# Patient Record
Sex: FEMALE | Race: ASIAN | NOT HISPANIC OR LATINO
[De-identification: names, ages, dates, MRNs, and addresses within clinical notes are randomized per-mention and may not be internally consistent; named-entity substitution may affect disease eponyms.]

---

## 2018-04-18 PROBLEM — Z00.00 ENCOUNTER FOR PREVENTIVE HEALTH EXAMINATION: Status: ACTIVE | Noted: 2018-04-18

## 2018-05-17 ENCOUNTER — LABORATORY RESULT (OUTPATIENT)
Age: 23
End: 2018-05-17

## 2018-05-17 ENCOUNTER — APPOINTMENT (OUTPATIENT)
Dept: OBGYN | Facility: CLINIC | Age: 23
End: 2018-05-17
Payer: COMMERCIAL

## 2018-05-17 VITALS
BODY MASS INDEX: 28.68 KG/M2 | DIASTOLIC BLOOD PRESSURE: 60 MMHG | SYSTOLIC BLOOD PRESSURE: 91 MMHG | WEIGHT: 142.25 LBS | HEIGHT: 59 IN

## 2018-05-17 DIAGNOSIS — Z78.9 OTHER SPECIFIED HEALTH STATUS: ICD-10-CM

## 2018-05-17 DIAGNOSIS — Z83.3 FAMILY HISTORY OF DIABETES MELLITUS: ICD-10-CM

## 2018-05-17 DIAGNOSIS — Z82.49 FAMILY HISTORY OF ISCHEMIC HEART DISEASE AND OTHER DISEASES OF THE CIRCULATORY SYSTEM: ICD-10-CM

## 2018-05-17 PROCEDURE — 0501F PRENATAL FLOW SHEET: CPT

## 2018-05-21 ENCOUNTER — NON-APPOINTMENT (OUTPATIENT)
Age: 23
End: 2018-05-21

## 2018-05-21 PROBLEM — Z83.3 FAMILY HISTORY OF DIABETES MELLITUS: Status: ACTIVE | Noted: 2018-05-21

## 2018-05-21 PROBLEM — Z78.9 DOES NOT USE ILLICIT DRUGS: Status: ACTIVE | Noted: 2018-05-21

## 2018-05-21 PROBLEM — Z82.49 FAMILY HISTORY OF ESSENTIAL HYPERTENSION: Status: ACTIVE | Noted: 2018-05-21

## 2018-06-05 ENCOUNTER — OTHER (OUTPATIENT)
Age: 23
End: 2018-06-05

## 2018-06-18 ENCOUNTER — ASOB RESULT (OUTPATIENT)
Age: 23
End: 2018-06-18

## 2018-06-18 ENCOUNTER — APPOINTMENT (OUTPATIENT)
Dept: ANTEPARTUM | Facility: CLINIC | Age: 23
End: 2018-06-18
Payer: COMMERCIAL

## 2018-06-18 PROCEDURE — 76811 OB US DETAILED SNGL FETUS: CPT

## 2018-06-19 ENCOUNTER — APPOINTMENT (OUTPATIENT)
Dept: OBGYN | Facility: CLINIC | Age: 23
End: 2018-06-19

## 2018-06-28 ENCOUNTER — NON-APPOINTMENT (OUTPATIENT)
Age: 23
End: 2018-06-28

## 2018-06-28 ENCOUNTER — APPOINTMENT (OUTPATIENT)
Dept: OBGYN | Facility: CLINIC | Age: 23
End: 2018-06-28
Payer: COMMERCIAL

## 2018-06-28 VITALS
DIASTOLIC BLOOD PRESSURE: 76 MMHG | SYSTOLIC BLOOD PRESSURE: 112 MMHG | WEIGHT: 145.19 LBS | BODY MASS INDEX: 29.27 KG/M2 | HEIGHT: 59 IN

## 2018-06-28 PROCEDURE — 0502F SUBSEQUENT PRENATAL CARE: CPT

## 2018-07-01 ENCOUNTER — TRANSCRIPTION ENCOUNTER (OUTPATIENT)
Age: 23
End: 2018-07-01

## 2018-07-30 ENCOUNTER — NON-APPOINTMENT (OUTPATIENT)
Age: 23
End: 2018-07-30

## 2018-07-30 ENCOUNTER — APPOINTMENT (OUTPATIENT)
Dept: OBGYN | Facility: CLINIC | Age: 23
End: 2018-07-30
Payer: COMMERCIAL

## 2018-07-30 VITALS
DIASTOLIC BLOOD PRESSURE: 79 MMHG | HEIGHT: 59 IN | BODY MASS INDEX: 30.04 KG/M2 | WEIGHT: 149 LBS | SYSTOLIC BLOOD PRESSURE: 114 MMHG

## 2018-07-30 PROCEDURE — 0502F SUBSEQUENT PRENATAL CARE: CPT

## 2018-07-31 ENCOUNTER — OTHER (OUTPATIENT)
Age: 23
End: 2018-07-31

## 2018-07-31 LAB
BASOPHILS # BLD AUTO: 0.02 K/UL
BASOPHILS NFR BLD AUTO: 0.1 %
EOSINOPHIL # BLD AUTO: 0.24 K/UL
EOSINOPHIL NFR BLD AUTO: 1.8 %
GLUCOSE 1H P 50 G GLC PO SERPL-MCNC: 129 MG/DL
HCT VFR BLD CALC: 35 %
HGB BLD-MCNC: 11.2 G/DL
IMM GRANULOCYTES NFR BLD AUTO: 0.3 %
LYMPHOCYTES # BLD AUTO: 3.04 K/UL
LYMPHOCYTES NFR BLD AUTO: 22.7 %
MAN DIFF?: NORMAL
MCHC RBC-ENTMCNC: 28.9 PG
MCHC RBC-ENTMCNC: 32 GM/DL
MCV RBC AUTO: 90.2 FL
MONOCYTES # BLD AUTO: 0.81 K/UL
MONOCYTES NFR BLD AUTO: 6.1 %
NEUTROPHILS # BLD AUTO: 9.23 K/UL
NEUTROPHILS NFR BLD AUTO: 69 %
PLATELET # BLD AUTO: 283 K/UL
RBC # BLD: 3.88 M/UL
RBC # FLD: 15 %
WBC # FLD AUTO: 13.38 K/UL

## 2018-08-15 ENCOUNTER — APPOINTMENT (OUTPATIENT)
Dept: OBGYN | Facility: CLINIC | Age: 23
End: 2018-08-15
Payer: COMMERCIAL

## 2018-08-15 ENCOUNTER — NON-APPOINTMENT (OUTPATIENT)
Age: 23
End: 2018-08-15

## 2018-08-15 VITALS
DIASTOLIC BLOOD PRESSURE: 73 MMHG | BODY MASS INDEX: 30.35 KG/M2 | WEIGHT: 150.56 LBS | SYSTOLIC BLOOD PRESSURE: 114 MMHG | HEIGHT: 59 IN

## 2018-08-15 PROCEDURE — 0502F SUBSEQUENT PRENATAL CARE: CPT

## 2018-09-06 ENCOUNTER — NON-APPOINTMENT (OUTPATIENT)
Age: 23
End: 2018-09-06

## 2018-09-06 ENCOUNTER — APPOINTMENT (OUTPATIENT)
Dept: OBGYN | Facility: CLINIC | Age: 23
End: 2018-09-06
Payer: COMMERCIAL

## 2018-09-06 VITALS
SYSTOLIC BLOOD PRESSURE: 124 MMHG | BODY MASS INDEX: 31.6 KG/M2 | HEIGHT: 59 IN | DIASTOLIC BLOOD PRESSURE: 82 MMHG | WEIGHT: 156.75 LBS

## 2018-09-06 PROCEDURE — 0502F SUBSEQUENT PRENATAL CARE: CPT

## 2018-09-20 ENCOUNTER — APPOINTMENT (OUTPATIENT)
Dept: OBGYN | Facility: CLINIC | Age: 23
End: 2018-09-20
Payer: COMMERCIAL

## 2018-09-20 ENCOUNTER — ASOB RESULT (OUTPATIENT)
Age: 23
End: 2018-09-20

## 2018-09-20 ENCOUNTER — NON-APPOINTMENT (OUTPATIENT)
Age: 23
End: 2018-09-20

## 2018-09-20 ENCOUNTER — APPOINTMENT (OUTPATIENT)
Dept: ANTEPARTUM | Facility: CLINIC | Age: 23
End: 2018-09-20
Payer: COMMERCIAL

## 2018-09-20 VITALS
HEIGHT: 59 IN | WEIGHT: 153.9 LBS | SYSTOLIC BLOOD PRESSURE: 116 MMHG | BODY MASS INDEX: 31.03 KG/M2 | DIASTOLIC BLOOD PRESSURE: 78 MMHG

## 2018-09-20 PROCEDURE — 76816 OB US FOLLOW-UP PER FETUS: CPT

## 2018-09-20 PROCEDURE — 90715 TDAP VACCINE 7 YRS/> IM: CPT

## 2018-09-20 PROCEDURE — 90471 IMMUNIZATION ADMIN: CPT

## 2018-09-20 PROCEDURE — 76819 FETAL BIOPHYS PROFIL W/O NST: CPT

## 2018-09-20 PROCEDURE — 0502F SUBSEQUENT PRENATAL CARE: CPT

## 2018-10-04 ENCOUNTER — NON-APPOINTMENT (OUTPATIENT)
Age: 23
End: 2018-10-04

## 2018-10-04 ENCOUNTER — APPOINTMENT (OUTPATIENT)
Dept: OBGYN | Facility: CLINIC | Age: 23
End: 2018-10-04
Payer: COMMERCIAL

## 2018-10-04 VITALS
WEIGHT: 206 LBS | BODY MASS INDEX: 41.53 KG/M2 | HEIGHT: 59 IN | SYSTOLIC BLOOD PRESSURE: 128 MMHG | HEART RATE: 102 BPM | DIASTOLIC BLOOD PRESSURE: 83 MMHG

## 2018-10-04 VITALS
DIASTOLIC BLOOD PRESSURE: 84 MMHG | SYSTOLIC BLOOD PRESSURE: 116 MMHG | WEIGHT: 157 LBS | HEIGHT: 59 IN | BODY MASS INDEX: 31.65 KG/M2

## 2018-10-04 PROCEDURE — 90688 IIV4 VACCINE SPLT 0.5 ML IM: CPT

## 2018-10-04 PROCEDURE — 0502F SUBSEQUENT PRENATAL CARE: CPT

## 2018-10-04 PROCEDURE — 90471 IMMUNIZATION ADMIN: CPT

## 2018-10-11 ENCOUNTER — APPOINTMENT (OUTPATIENT)
Dept: OBGYN | Facility: CLINIC | Age: 23
End: 2018-10-11
Payer: COMMERCIAL

## 2018-10-11 VITALS
HEIGHT: 59 IN | WEIGHT: 158.5 LBS | BODY MASS INDEX: 31.95 KG/M2 | SYSTOLIC BLOOD PRESSURE: 124 MMHG | DIASTOLIC BLOOD PRESSURE: 83 MMHG

## 2018-10-11 PROCEDURE — 0502F SUBSEQUENT PRENATAL CARE: CPT

## 2018-10-16 ENCOUNTER — OTHER (OUTPATIENT)
Age: 23
End: 2018-10-16

## 2018-10-16 ENCOUNTER — CLINICAL ADVICE (OUTPATIENT)
Age: 23
End: 2018-10-16

## 2018-10-18 ENCOUNTER — APPOINTMENT (OUTPATIENT)
Dept: OBGYN | Facility: CLINIC | Age: 23
End: 2018-10-18
Payer: COMMERCIAL

## 2018-10-18 VITALS
HEIGHT: 59 IN | BODY MASS INDEX: 32.25 KG/M2 | WEIGHT: 160 LBS | DIASTOLIC BLOOD PRESSURE: 81 MMHG | SYSTOLIC BLOOD PRESSURE: 118 MMHG

## 2018-10-18 DIAGNOSIS — Z34.03 ENCOUNTER FOR SUPERVISION OF NORMAL FIRST PREGNANCY, THIRD TRIMESTER: ICD-10-CM

## 2018-10-18 PROCEDURE — 0502F SUBSEQUENT PRENATAL CARE: CPT

## 2018-10-24 ENCOUNTER — INPATIENT (INPATIENT)
Facility: HOSPITAL | Age: 23
LOS: 3 days | Discharge: HOME | End: 2018-10-28
Attending: OBSTETRICS & GYNECOLOGY | Admitting: OBSTETRICS & GYNECOLOGY
Payer: COMMERCIAL

## 2018-10-24 ENCOUNTER — EMERGENCY (EMERGENCY)
Facility: HOSPITAL | Age: 23
LOS: 1 days | Discharge: STILL ADMITTED OR ADMINISTRATIVE | End: 2018-10-24
Admitting: EMERGENCY MEDICINE

## 2018-10-24 ENCOUNTER — TRANSCRIPTION ENCOUNTER (OUTPATIENT)
Age: 23
End: 2018-10-24

## 2018-10-24 ENCOUNTER — OTHER (OUTPATIENT)
Age: 23
End: 2018-10-24

## 2018-10-24 VITALS
HEART RATE: 116 BPM | RESPIRATION RATE: 20 BRPM | SYSTOLIC BLOOD PRESSURE: 129 MMHG | DIASTOLIC BLOOD PRESSURE: 90 MMHG | OXYGEN SATURATION: 100 % | TEMPERATURE: 98 F

## 2018-10-24 VITALS — HEIGHT: 59 IN | WEIGHT: 158.73 LBS

## 2018-10-24 DIAGNOSIS — O26.899 OTHER SPECIFIED PREGNANCY RELATED CONDITIONS, UNSPECIFIED TRIMESTER: ICD-10-CM

## 2018-10-24 DIAGNOSIS — Z3A.00 WEEKS OF GESTATION OF PREGNANCY NOT SPECIFIED: ICD-10-CM

## 2018-10-24 DIAGNOSIS — O42.10 PREMATURE RUPTURE OF MEMBRANES, ONSET OF LABOR MORE THAN 24 HOURS FOLLOWING RUPTURE, UNSPECIFIED WEEKS OF GESTATION: ICD-10-CM

## 2018-10-24 LAB
BASOPHILS # BLD AUTO: 0.02 K/UL — SIGNIFICANT CHANGE UP (ref 0–0.2)
BASOPHILS NFR BLD AUTO: 0.2 % — SIGNIFICANT CHANGE UP (ref 0–2)
BLD GP AB SCN SERPL QL: NEGATIVE — SIGNIFICANT CHANGE UP
EOSINOPHIL # BLD AUTO: 0.05 K/UL — SIGNIFICANT CHANGE UP (ref 0–0.5)
EOSINOPHIL NFR BLD AUTO: 0.5 % — SIGNIFICANT CHANGE UP (ref 0–6)
HBV SURFACE AG SER-ACNC: NEGATIVE — SIGNIFICANT CHANGE UP
HCT VFR BLD CALC: 38.9 % — SIGNIFICANT CHANGE UP (ref 34.5–45)
HGB BLD-MCNC: 12.6 G/DL — SIGNIFICANT CHANGE UP (ref 11.5–15.5)
IMM GRANULOCYTES # BLD AUTO: 0.06 # — SIGNIFICANT CHANGE UP
IMM GRANULOCYTES NFR BLD AUTO: 0.6 % — SIGNIFICANT CHANGE UP (ref 0–1.5)
LYMPHOCYTES # BLD AUTO: 1.73 K/UL — SIGNIFICANT CHANGE UP (ref 1–3.3)
LYMPHOCYTES # BLD AUTO: 17.9 % — SIGNIFICANT CHANGE UP (ref 13–44)
MCHC RBC-ENTMCNC: 28.7 PG — SIGNIFICANT CHANGE UP (ref 27–34)
MCHC RBC-ENTMCNC: 32.4 % — SIGNIFICANT CHANGE UP (ref 32–36)
MCV RBC AUTO: 88.6 FL — SIGNIFICANT CHANGE UP (ref 80–100)
MONOCYTES # BLD AUTO: 0.54 K/UL — SIGNIFICANT CHANGE UP (ref 0–0.9)
MONOCYTES NFR BLD AUTO: 5.6 % — SIGNIFICANT CHANGE UP (ref 2–14)
NEUTROPHILS # BLD AUTO: 7.27 K/UL — SIGNIFICANT CHANGE UP (ref 1.8–7.4)
NEUTROPHILS NFR BLD AUTO: 75.2 % — SIGNIFICANT CHANGE UP (ref 43–77)
NRBC # FLD: 0 — SIGNIFICANT CHANGE UP
PLATELET # BLD AUTO: 227 K/UL — SIGNIFICANT CHANGE UP (ref 150–400)
PMV BLD: 10.4 FL — SIGNIFICANT CHANGE UP (ref 7–13)
RBC # BLD: 4.39 M/UL — SIGNIFICANT CHANGE UP (ref 3.8–5.2)
RBC # FLD: 14.8 % — HIGH (ref 10.3–14.5)
RH IG SCN BLD-IMP: POSITIVE — SIGNIFICANT CHANGE UP
RH IG SCN BLD-IMP: POSITIVE — SIGNIFICANT CHANGE UP
RUBV IGG SER-ACNC: 1.2 INDEX — SIGNIFICANT CHANGE UP
RUBV IGG SER-IMP: POSITIVE — SIGNIFICANT CHANGE UP
T PALLIDUM AB TITR SER: NEGATIVE — SIGNIFICANT CHANGE UP
WBC # BLD: 9.67 K/UL — SIGNIFICANT CHANGE UP (ref 3.8–10.5)
WBC # FLD AUTO: 9.67 K/UL — SIGNIFICANT CHANGE UP (ref 3.8–10.5)

## 2018-10-24 RX ORDER — BUTORPHANOL TARTRATE 2 MG/ML
2 INJECTION, SOLUTION INTRAMUSCULAR; INTRAVENOUS ONCE
Qty: 0 | Refills: 0 | Status: DISCONTINUED | OUTPATIENT
Start: 2018-10-24 | End: 2018-10-25

## 2018-10-24 RX ORDER — OXYTOCIN 10 UNIT/ML
333.33 VIAL (ML) INJECTION
Qty: 20 | Refills: 0 | Status: DISCONTINUED | OUTPATIENT
Start: 2018-10-24 | End: 2018-10-25

## 2018-10-24 RX ORDER — SODIUM CHLORIDE 9 MG/ML
1000 INJECTION, SOLUTION INTRAVENOUS
Qty: 0 | Refills: 0 | Status: DISCONTINUED | OUTPATIENT
Start: 2018-10-24 | End: 2018-10-25

## 2018-10-24 RX ORDER — SODIUM CHLORIDE 9 MG/ML
1000 INJECTION, SOLUTION INTRAVENOUS ONCE
Qty: 0 | Refills: 0 | Status: DISCONTINUED | OUTPATIENT
Start: 2018-10-24 | End: 2018-10-25

## 2018-10-24 RX ADMIN — SODIUM CHLORIDE 250 MILLILITER(S): 9 INJECTION, SOLUTION INTRAVENOUS at 14:03

## 2018-10-24 RX ADMIN — SODIUM CHLORIDE 250 MILLILITER(S): 9 INJECTION, SOLUTION INTRAVENOUS at 18:04

## 2018-10-25 ENCOUNTER — TRANSCRIPTION ENCOUNTER (OUTPATIENT)
Age: 23
End: 2018-10-25

## 2018-10-25 ENCOUNTER — APPOINTMENT (OUTPATIENT)
Dept: OBGYN | Facility: CLINIC | Age: 23
End: 2018-10-25

## 2018-10-25 PROCEDURE — 59510 CESAREAN DELIVERY: CPT | Mod: U9,UB,GC

## 2018-10-25 RX ORDER — HYDROMORPHONE HYDROCHLORIDE 2 MG/ML
1 INJECTION INTRAMUSCULAR; INTRAVENOUS; SUBCUTANEOUS
Qty: 0 | Refills: 0 | Status: DISCONTINUED | OUTPATIENT
Start: 2018-10-26 | End: 2018-10-26

## 2018-10-25 RX ORDER — ONDANSETRON 8 MG/1
4 TABLET, FILM COATED ORAL EVERY 6 HOURS
Qty: 0 | Refills: 0 | Status: DISCONTINUED | OUTPATIENT
Start: 2018-10-26 | End: 2018-10-27

## 2018-10-25 RX ORDER — AMPICILLIN TRIHYDRATE 250 MG
2 CAPSULE ORAL EVERY 6 HOURS
Qty: 0 | Refills: 0 | Status: DISCONTINUED | OUTPATIENT
Start: 2018-10-25 | End: 2018-10-25

## 2018-10-25 RX ORDER — CITRIC ACID/SODIUM CITRATE 300-500 MG
30 SOLUTION, ORAL ORAL ONCE
Qty: 0 | Refills: 0 | Status: COMPLETED | OUTPATIENT
Start: 2018-10-25 | End: 2018-10-25

## 2018-10-25 RX ORDER — BUTORPHANOL TARTRATE 2 MG/ML
0.12 INJECTION, SOLUTION INTRAMUSCULAR; INTRAVENOUS EVERY 6 HOURS
Qty: 0 | Refills: 0 | Status: DISCONTINUED | OUTPATIENT
Start: 2018-10-26 | End: 2018-10-27

## 2018-10-25 RX ORDER — OXYCODONE HYDROCHLORIDE 5 MG/1
5 TABLET ORAL
Qty: 0 | Refills: 0 | Status: DISCONTINUED | OUTPATIENT
Start: 2018-10-26 | End: 2018-10-26

## 2018-10-25 RX ORDER — NALOXONE HYDROCHLORIDE 4 MG/.1ML
0.1 SPRAY NASAL
Qty: 0 | Refills: 0 | Status: DISCONTINUED | OUTPATIENT
Start: 2018-10-26 | End: 2018-10-27

## 2018-10-25 RX ORDER — GENTAMICIN SULFATE 40 MG/ML
360 VIAL (ML) INJECTION EVERY 24 HOURS
Qty: 0 | Refills: 0 | Status: DISCONTINUED | OUTPATIENT
Start: 2018-10-25 | End: 2018-10-25

## 2018-10-25 RX ORDER — IBUPROFEN 200 MG
600 TABLET ORAL EVERY 6 HOURS
Qty: 0 | Refills: 0 | Status: COMPLETED | OUTPATIENT
Start: 2018-10-26 | End: 2019-09-24

## 2018-10-25 RX ORDER — SIMETHICONE 80 MG/1
80 TABLET, CHEWABLE ORAL EVERY 4 HOURS
Qty: 0 | Refills: 0 | Status: DISCONTINUED | OUTPATIENT
Start: 2018-10-26 | End: 2018-10-28

## 2018-10-25 RX ORDER — SODIUM CHLORIDE 9 MG/ML
1000 INJECTION, SOLUTION INTRAVENOUS
Qty: 0 | Refills: 0 | Status: DISCONTINUED | OUTPATIENT
Start: 2018-10-26 | End: 2018-10-26

## 2018-10-25 RX ORDER — SODIUM CHLORIDE 9 MG/ML
1000 INJECTION, SOLUTION INTRAVENOUS ONCE
Qty: 0 | Refills: 0 | Status: COMPLETED | OUTPATIENT
Start: 2018-10-25 | End: 2018-10-25

## 2018-10-25 RX ORDER — OXYTOCIN 10 UNIT/ML
1 VIAL (ML) INJECTION
Qty: 30 | Refills: 0 | Status: DISCONTINUED | OUTPATIENT
Start: 2018-10-25 | End: 2018-10-25

## 2018-10-25 RX ORDER — METOCLOPRAMIDE HCL 10 MG
10 TABLET ORAL ONCE
Qty: 0 | Refills: 0 | Status: COMPLETED | OUTPATIENT
Start: 2018-10-25 | End: 2018-10-25

## 2018-10-25 RX ORDER — GLYCERIN ADULT
1 SUPPOSITORY, RECTAL RECTAL AT BEDTIME
Qty: 0 | Refills: 0 | Status: DISCONTINUED | OUTPATIENT
Start: 2018-10-26 | End: 2018-10-28

## 2018-10-25 RX ORDER — OXYTOCIN 10 UNIT/ML
41.67 VIAL (ML) INJECTION
Qty: 20 | Refills: 0 | Status: DISCONTINUED | OUTPATIENT
Start: 2018-10-25 | End: 2018-10-26

## 2018-10-25 RX ORDER — ACETAMINOPHEN 500 MG
975 TABLET ORAL EVERY 6 HOURS
Qty: 0 | Refills: 0 | Status: DISCONTINUED | OUTPATIENT
Start: 2018-10-26 | End: 2018-10-28

## 2018-10-25 RX ORDER — FERROUS SULFATE 325(65) MG
325 TABLET ORAL DAILY
Qty: 0 | Refills: 0 | Status: DISCONTINUED | OUTPATIENT
Start: 2018-10-26 | End: 2018-10-28

## 2018-10-25 RX ORDER — OXYCODONE HYDROCHLORIDE 5 MG/1
5 TABLET ORAL
Qty: 0 | Refills: 0 | Status: DISCONTINUED | OUTPATIENT
Start: 2018-10-26 | End: 2018-10-28

## 2018-10-25 RX ORDER — TETANUS TOXOID, REDUCED DIPHTHERIA TOXOID AND ACELLULAR PERTUSSIS VACCINE, ADSORBED 5; 2.5; 8; 8; 2.5 [IU]/.5ML; [IU]/.5ML; UG/.5ML; UG/.5ML; UG/.5ML
0.5 SUSPENSION INTRAMUSCULAR ONCE
Qty: 0 | Refills: 0 | Status: DISCONTINUED | OUTPATIENT
Start: 2018-10-26 | End: 2018-10-28

## 2018-10-25 RX ORDER — FAMOTIDINE 10 MG/ML
20 INJECTION INTRAVENOUS ONCE
Qty: 0 | Refills: 0 | Status: DISCONTINUED | OUTPATIENT
Start: 2018-10-25 | End: 2018-10-25

## 2018-10-25 RX ORDER — LANOLIN
1 OINTMENT (GRAM) TOPICAL
Qty: 0 | Refills: 0 | Status: DISCONTINUED | OUTPATIENT
Start: 2018-10-26 | End: 2018-10-28

## 2018-10-25 RX ORDER — DOCUSATE SODIUM 100 MG
100 CAPSULE ORAL
Qty: 0 | Refills: 0 | Status: DISCONTINUED | OUTPATIENT
Start: 2018-10-26 | End: 2018-10-28

## 2018-10-25 RX ORDER — ACETAMINOPHEN 500 MG
975 TABLET ORAL ONCE
Qty: 0 | Refills: 0 | Status: COMPLETED | OUTPATIENT
Start: 2018-10-25 | End: 2018-10-25

## 2018-10-25 RX ORDER — DIPHENHYDRAMINE HCL 50 MG
25 CAPSULE ORAL EVERY 6 HOURS
Qty: 0 | Refills: 0 | Status: DISCONTINUED | OUTPATIENT
Start: 2018-10-26 | End: 2018-10-28

## 2018-10-25 RX ORDER — HEPARIN SODIUM 5000 [USP'U]/ML
5000 INJECTION INTRAVENOUS; SUBCUTANEOUS EVERY 12 HOURS
Qty: 0 | Refills: 0 | Status: DISCONTINUED | OUTPATIENT
Start: 2018-10-26 | End: 2018-10-28

## 2018-10-25 RX ORDER — SODIUM CHLORIDE 9 MG/ML
300 INJECTION INTRAMUSCULAR; INTRAVENOUS; SUBCUTANEOUS ONCE
Qty: 0 | Refills: 0 | Status: COMPLETED | OUTPATIENT
Start: 2018-10-25 | End: 2018-10-25

## 2018-10-25 RX ORDER — SODIUM CHLORIDE 9 MG/ML
1000 INJECTION INTRAMUSCULAR; INTRAVENOUS; SUBCUTANEOUS
Qty: 0 | Refills: 0 | Status: DISCONTINUED | OUTPATIENT
Start: 2018-10-25 | End: 2018-10-25

## 2018-10-25 RX ADMIN — Medication 30 MILLILITER(S): at 20:42

## 2018-10-25 RX ADMIN — Medication 216 GRAM(S): at 16:40

## 2018-10-25 RX ADMIN — Medication 125 MILLIUNIT(S)/MIN: at 23:33

## 2018-10-25 RX ADMIN — SODIUM CHLORIDE 600 MILLILITER(S): 9 INJECTION INTRAMUSCULAR; INTRAVENOUS; SUBCUTANEOUS at 16:59

## 2018-10-25 RX ADMIN — Medication 1 MILLIUNIT(S)/MIN: at 16:59

## 2018-10-25 RX ADMIN — Medication 250 MILLIGRAM(S): at 18:19

## 2018-10-25 RX ADMIN — BUTORPHANOL TARTRATE 2 MILLIGRAM(S): 2 INJECTION, SOLUTION INTRAMUSCULAR; INTRAVENOUS at 00:14

## 2018-10-25 RX ADMIN — SODIUM CHLORIDE 2000 MILLILITER(S): 9 INJECTION, SOLUTION INTRAVENOUS at 23:15

## 2018-10-25 RX ADMIN — Medication 975 MILLIGRAM(S): at 16:40

## 2018-10-25 RX ADMIN — Medication 10 MILLIGRAM(S): at 20:42

## 2018-10-25 RX ADMIN — FAMOTIDINE 20 MILLIGRAM(S): 10 INJECTION INTRAVENOUS at 20:42

## 2018-10-25 RX ADMIN — BUTORPHANOL TARTRATE 2 MILLIGRAM(S): 2 INJECTION, SOLUTION INTRAMUSCULAR; INTRAVENOUS at 00:41

## 2018-10-25 NOTE — DISCHARGE NOTE OB - HOSPITAL COURSE
PROM at 41 weeks, induction of labor, arrest of dilation, primary  section 10/25/18, male, OP asynclitic, apgars 9,9

## 2018-10-25 NOTE — DISCHARGE NOTE OB - CARE PLAN
Principal Discharge DX:	 delivery delivered  Goal:	complete recovery  Assessment and plan of treatment:	stable, routine post partum care

## 2018-10-25 NOTE — DISCHARGE NOTE OB - PATIENT PORTAL LINK FT
You can access the [facility], offered by [facility], by registering with the following website: [email or web address]

## 2018-10-25 NOTE — DISCHARGE NOTE OB - CARE PROVIDER_API CALL
Rocio Guy (MD), Obstetrics and Gynecology  [facility] [place] [address]  Phone: [phone]  Fax: [phone]

## 2018-10-26 LAB
ALBUMIN SERPL ELPH-MCNC: 2.4 G/DL — LOW (ref 3.3–5)
ALP SERPL-CCNC: 100 U/L — SIGNIFICANT CHANGE UP (ref 40–120)
ALT FLD-CCNC: 16 U/L — SIGNIFICANT CHANGE UP (ref 4–33)
APPEARANCE UR: SIGNIFICANT CHANGE UP
AST SERPL-CCNC: 29 U/L — SIGNIFICANT CHANGE UP (ref 4–32)
BACTERIA # UR AUTO: NEGATIVE — SIGNIFICANT CHANGE UP
BASOPHILS # BLD AUTO: 0.02 K/UL — SIGNIFICANT CHANGE UP (ref 0–0.2)
BASOPHILS NFR BLD AUTO: 0.1 % — SIGNIFICANT CHANGE UP (ref 0–2)
BILIRUB SERPL-MCNC: 0.3 MG/DL — SIGNIFICANT CHANGE UP (ref 0.2–1.2)
BILIRUB UR-MCNC: NEGATIVE — SIGNIFICANT CHANGE UP
BLOOD UR QL VISUAL: HIGH
BUN SERPL-MCNC: 11 MG/DL — SIGNIFICANT CHANGE UP (ref 7–23)
CALCIUM SERPL-MCNC: 7.9 MG/DL — LOW (ref 8.4–10.5)
CHLORIDE SERPL-SCNC: 106 MMOL/L — SIGNIFICANT CHANGE UP (ref 98–107)
CO2 SERPL-SCNC: 22 MMOL/L — SIGNIFICANT CHANGE UP (ref 22–31)
COLOR SPEC: YELLOW — SIGNIFICANT CHANGE UP
CREAT SERPL-MCNC: 0.87 MG/DL — SIGNIFICANT CHANGE UP (ref 0.5–1.3)
EOSINOPHIL # BLD AUTO: 0 K/UL — SIGNIFICANT CHANGE UP (ref 0–0.5)
EOSINOPHIL NFR BLD AUTO: 0 % — SIGNIFICANT CHANGE UP (ref 0–6)
GLUCOSE SERPL-MCNC: 98 MG/DL — SIGNIFICANT CHANGE UP (ref 70–99)
GLUCOSE UR-MCNC: 30 — SIGNIFICANT CHANGE UP
HCT VFR BLD CALC: 32.4 % — LOW (ref 34.5–45)
HGB BLD-MCNC: 10.7 G/DL — LOW (ref 11.5–15.5)
HYALINE CASTS # UR AUTO: SIGNIFICANT CHANGE UP
IMM GRANULOCYTES # BLD AUTO: 0.11 # — SIGNIFICANT CHANGE UP
IMM GRANULOCYTES NFR BLD AUTO: 0.7 % — SIGNIFICANT CHANGE UP (ref 0–1.5)
INR BLD: 0.89 — SIGNIFICANT CHANGE UP (ref 0.88–1.17)
KETONES UR-MCNC: NEGATIVE — SIGNIFICANT CHANGE UP
LACTATE SERPL-SCNC: 1.9 MMOL/L — SIGNIFICANT CHANGE UP (ref 0.5–2)
LEUKOCYTE ESTERASE UR-ACNC: NEGATIVE — SIGNIFICANT CHANGE UP
LYMPHOCYTES # BLD AUTO: 1.37 K/UL — SIGNIFICANT CHANGE UP (ref 1–3.3)
LYMPHOCYTES # BLD AUTO: 8.5 % — LOW (ref 13–44)
MAGNESIUM SERPL-MCNC: 1.4 MG/DL — LOW (ref 1.6–2.6)
MCHC RBC-ENTMCNC: 29.2 PG — SIGNIFICANT CHANGE UP (ref 27–34)
MCHC RBC-ENTMCNC: 33 % — SIGNIFICANT CHANGE UP (ref 32–36)
MCV RBC AUTO: 88.3 FL — SIGNIFICANT CHANGE UP (ref 80–100)
MONOCYTES # BLD AUTO: 1.01 K/UL — HIGH (ref 0–0.9)
MONOCYTES NFR BLD AUTO: 6.2 % — SIGNIFICANT CHANGE UP (ref 2–14)
NEUTROPHILS # BLD AUTO: 13.69 K/UL — HIGH (ref 1.8–7.4)
NEUTROPHILS NFR BLD AUTO: 84.5 % — HIGH (ref 43–77)
NITRITE UR-MCNC: NEGATIVE — SIGNIFICANT CHANGE UP
NRBC # FLD: 0 — SIGNIFICANT CHANGE UP
PH UR: 6.5 — SIGNIFICANT CHANGE UP (ref 5–8)
PHOSPHATE SERPL-MCNC: 3.8 MG/DL — SIGNIFICANT CHANGE UP (ref 2.5–4.5)
PLATELET # BLD AUTO: 189 K/UL — SIGNIFICANT CHANGE UP (ref 150–400)
PMV BLD: 10 FL — SIGNIFICANT CHANGE UP (ref 7–13)
POTASSIUM SERPL-MCNC: 4.3 MMOL/L — SIGNIFICANT CHANGE UP (ref 3.5–5.3)
POTASSIUM SERPL-SCNC: 4.3 MMOL/L — SIGNIFICANT CHANGE UP (ref 3.5–5.3)
PROT SERPL-MCNC: 5.4 G/DL — LOW (ref 6–8.3)
PROT UR-MCNC: 50 — SIGNIFICANT CHANGE UP
PROTHROM AB SERPL-ACNC: 10.2 SEC — SIGNIFICANT CHANGE UP (ref 9.8–13.1)
RBC # BLD: 3.67 M/UL — LOW (ref 3.8–5.2)
RBC # FLD: 14.7 % — HIGH (ref 10.3–14.5)
RBC CASTS # UR COMP ASSIST: HIGH (ref 0–?)
SODIUM SERPL-SCNC: 141 MMOL/L — SIGNIFICANT CHANGE UP (ref 135–145)
SP GR SPEC: 1.02 — SIGNIFICANT CHANGE UP (ref 1–1.04)
SQUAMOUS # UR AUTO: SIGNIFICANT CHANGE UP
UROBILINOGEN FLD QL: NORMAL — SIGNIFICANT CHANGE UP
WBC # BLD: 16.2 K/UL — HIGH (ref 3.8–10.5)
WBC # FLD AUTO: 16.2 K/UL — HIGH (ref 3.8–10.5)
WBC UR QL: SIGNIFICANT CHANGE UP (ref 0–?)

## 2018-10-26 PROCEDURE — 71045 X-RAY EXAM CHEST 1 VIEW: CPT | Mod: 26

## 2018-10-26 RX ORDER — IBUPROFEN 200 MG
600 TABLET ORAL EVERY 6 HOURS
Qty: 0 | Refills: 0 | Status: DISCONTINUED | OUTPATIENT
Start: 2018-10-26 | End: 2018-10-28

## 2018-10-26 RX ORDER — KETOROLAC TROMETHAMINE 30 MG/ML
30 SYRINGE (ML) INJECTION EVERY 6 HOURS
Qty: 0 | Refills: 0 | Status: DISCONTINUED | OUTPATIENT
Start: 2018-10-26 | End: 2018-10-26

## 2018-10-26 RX ORDER — ERTAPENEM SODIUM 1 G/1
INJECTION, POWDER, LYOPHILIZED, FOR SOLUTION INTRAMUSCULAR; INTRAVENOUS
Qty: 0 | Refills: 0 | Status: DISCONTINUED | OUTPATIENT
Start: 2018-10-26 | End: 2018-10-26

## 2018-10-26 RX ORDER — ERTAPENEM SODIUM 1 G/1
1000 INJECTION, POWDER, LYOPHILIZED, FOR SOLUTION INTRAMUSCULAR; INTRAVENOUS ONCE
Qty: 0 | Refills: 0 | Status: COMPLETED | OUTPATIENT
Start: 2018-10-26 | End: 2018-10-26

## 2018-10-26 RX ADMIN — Medication 975 MILLIGRAM(S): at 05:04

## 2018-10-26 RX ADMIN — Medication 975 MILLIGRAM(S): at 06:00

## 2018-10-26 RX ADMIN — Medication 30 MILLIGRAM(S): at 08:46

## 2018-10-26 RX ADMIN — Medication 30 MILLIGRAM(S): at 09:15

## 2018-10-26 RX ADMIN — Medication 325 MILLIGRAM(S): at 17:34

## 2018-10-26 RX ADMIN — ERTAPENEM SODIUM 120 MILLIGRAM(S): 1 INJECTION, POWDER, LYOPHILIZED, FOR SOLUTION INTRAMUSCULAR; INTRAVENOUS at 05:10

## 2018-10-26 RX ADMIN — Medication 30 MILLIGRAM(S): at 14:57

## 2018-10-26 RX ADMIN — SIMETHICONE 80 MILLIGRAM(S): 80 TABLET, CHEWABLE ORAL at 23:42

## 2018-10-26 RX ADMIN — HEPARIN SODIUM 5000 UNIT(S): 5000 INJECTION INTRAVENOUS; SUBCUTANEOUS at 17:34

## 2018-10-26 RX ADMIN — Medication 975 MILLIGRAM(S): at 18:00

## 2018-10-26 RX ADMIN — Medication 975 MILLIGRAM(S): at 18:30

## 2018-10-26 RX ADMIN — Medication 1 TABLET(S): at 17:33

## 2018-10-26 RX ADMIN — Medication 30 MILLIGRAM(S): at 23:00

## 2018-10-26 RX ADMIN — Medication 100 MILLIGRAM(S): at 23:43

## 2018-10-26 RX ADMIN — SIMETHICONE 80 MILLIGRAM(S): 80 TABLET, CHEWABLE ORAL at 17:45

## 2018-10-26 RX ADMIN — Medication 30 MILLIGRAM(S): at 22:17

## 2018-10-26 RX ADMIN — Medication 30 MILLIGRAM(S): at 15:57

## 2018-10-26 RX ADMIN — HEPARIN SODIUM 5000 UNIT(S): 5000 INJECTION INTRAVENOUS; SUBCUTANEOUS at 06:29

## 2018-10-26 NOTE — PROGRESS NOTE ADULT - SUBJECTIVE AND OBJECTIVE BOX
OB Progress Note:  Delivery, POD#1    S: 23y POD#1 s/p LTCS . Pt seen at the bedside for c/o SOB and difficulty taking deep breaths. Pt O2 Sat 93% on RA, HR 120s, Temp 37.9 F. Denies N/V, CP, fever, chills.  Pain is well controlled. Has not yet been out of bed.   Indwelling catheter is in place. Patient is breastfeeding.    O:   Vital Signs Last 24 Hrs  T(C): 37.9 (26 Oct 2018 03:45), Max: 37.9 (26 Oct 2018 03:45)  T(F): 100.2 (26 Oct 2018 03:45), Max: 100.2 (26 Oct 2018 03:45)  HR: 117 (26 Oct 2018 03:10) (84 - 117)  BP: 125/79 (26 Oct 2018 03:10) (123/84 - 135/82)  BP(mean): 96 (26 Oct 2018 01:00) (83 - 98)  RR: 20 (26 Oct 2018 03:45) (13 - 30)  SpO2: 95% (26 Oct 2018 03:45) (90% - 99%)    10-25-18 @ 07:01  -  10-26-18 @ 04:28  --------------------------------------------------------  IN: 3.27 mL/kg/hr / OUT: 1.93 mL/kg/hr / NET: 1.35 mL/kg/hr        PE:  General: NAD  Pulm: Clear to auscultation bilaterally  Cardio: Tachycardic to 120s and normal rhythm  Abdomen: Mildly distended, soft, appropriately tender, firm uterine fundus   Incision: clean, dry, intact w/ steri strips   Extremities: No erythema, no pitting edema, non-tender    Labs:  Blood Type: B Positive  Antibody Screen: --  Rubella IgG: Positive (Positive=Immune, Negative=Non-Immune)  RPR: Negative                            12.6   9.67  )-----------( 227      ( 24 Oct 2018 13:45 )             38.9       Bedside FAST scan: neg for         Medications:  MEDICATIONS  (STANDING):  acetaminophen   Tablet .. 975 milliGRAM(s) Oral every 6 hours  diphtheria/tetanus/pertussis (acellular) Vaccine (ADAcel) 0.5 milliLiter(s) IntraMuscular once  ertapenem  IVPB 1000 milliGRAM(s) IV Intermittent once  ertapenem  IVPB      ferrous    sulfate 325 milliGRAM(s) Oral daily  heparin  Injectable 5000 Unit(s) SubCutaneous every 12 hours  ibuprofen  Tablet. 600 milliGRAM(s) Oral every 6 hours  ketorolac   Injectable 30 milliGRAM(s) IV Push every 6 hours  lactated ringers. 1000 milliLiter(s) (125 mL/Hr) IV Continuous <Continuous>  oxyCODONE    IR 5 milliGRAM(s) Oral every 3 hours  oxytocin Infusion 41.667 milliUNIT(s)/Min (125 mL/Hr) IV Continuous <Continuous>  prenatal multivitamin 1 Tablet(s) Oral daily    MEDICATIONS  (PRN):  butorphanol Injectable 0.125 milliGRAM(s) IV Push every 6 hours PRN Pruritus  diphenhydrAMINE 25 milliGRAM(s) Oral every 6 hours PRN Itching  docusate sodium 100 milliGRAM(s) Oral two times a day PRN Stool Softening  glycerin Suppository - Adult 1 Suppository(s) Rectal at bedtime PRN Constipation  HYDROmorphone  Injectable 1 milliGRAM(s) IV Push every 3 hours PRN Severe Pain (7 - 10)  lanolin Ointment 1 Application(s) Topical every 3 hours PRN Sore Nipples  naloxone Injectable 0.1 milliGRAM(s) IV Push every 3 minutes PRN For ANY of the following changes in patient status:  A. RR LESS THAN 10 breaths per minute, B. Oxygen saturation LESS THAN 90%, C. Sedation score of 6  ondansetron Injectable 4 milliGRAM(s) IV Push every 6 hours PRN Nausea  oxyCODONE    IR 5 milliGRAM(s) Oral every 3 hours PRN Mild Pain (1 - 3)  simethicone 80 milliGRAM(s) Chew every 4 hours PRN Gas      A/P: 23y POD#1 s/p LTCS.  Patient is stable and doing well post-operatively.    - Continue regular diet.  - Increase ambulation.  - Continue motrin, tylenol, oxycodone PRN for pain control.  vs. continue PCEA for pain.  - F/U AM CBC  - Discontinue godinez at 24 hrs post-op      Stephani Vu, PGY-1 OB Progress Note:  Delivery, POD#1    S: 23y POD#1 s/p LTCS . Pt seen at the bedside for c/o SOB and difficulty taking deep breaths. Pt O2 Sat 93% on RA, HR 120s, Temp 37.9 F. Denies N/V, CP, fever, chills.  Pain is well controlled. Has not yet been out of bed.   Indwelling catheter is in place. Patient is breastfeeding.    O:   Vital Signs Last 24 Hrs  T(C): 37.9 (26 Oct 2018 03:45), Max: 37.9 (26 Oct 2018 03:45)  T(F): 100.2 (26 Oct 2018 03:45), Max: 100.2 (26 Oct 2018 03:45)  HR: 117 (26 Oct 2018 03:10) (84 - 117)  BP: 125/79 (26 Oct 2018 03:10) (123/84 - 135/82)  BP(mean): 96 (26 Oct 2018 01:00) (83 - 98)  RR: 20 (26 Oct 2018 03:45) (13 - 30)  SpO2: 95% (26 Oct 2018 03:45) (90% - 99%)    10-25-18 @ 07:01  -  10-26-18 @ 04:28  --------------------------------------------------------  IN: 3.27 mL/kg/hr / OUT: 1.93 mL/kg/hr / NET: 1.35 mL/kg/hr        PE:  General: NAD  Pulm: Clear to auscultation bilaterally  Cardio: Tachycardic to 120s and normal rhythm  Abdomen: Mildly distended, soft, appropriately tender, firm uterine fundus   Incision: clean, dry, intact w/ steri strips   : Indwelling catheter in place draining clear yellow urine  Extremities: No erythema, no pitting edema, non-tender    Labs:  Blood Type: B Positive  Antibody Screen: --  Rubella IgG: Positive (Positive=Immune, Negative=Non-Immune)  RPR: Negative                            12.6   9.67  )-----------( 227      ( 24 Oct 2018 13:45 )             38.9       Bedside FAST scan: Negative  Lung sono: B-lines  Portable Chest X-ray: Clear Lungs bilaterally      Medications:  MEDICATIONS  (STANDING):  acetaminophen   Tablet .. 975 milliGRAM(s) Oral every 6 hours  diphtheria/tetanus/pertussis (acellular) Vaccine (ADAcel) 0.5 milliLiter(s) IntraMuscular once  ertapenem  IVPB 1000 milliGRAM(s) IV Intermittent once  ertapenem  IVPB      ferrous    sulfate 325 milliGRAM(s) Oral daily  heparin  Injectable 5000 Unit(s) SubCutaneous every 12 hours  ibuprofen  Tablet. 600 milliGRAM(s) Oral every 6 hours  ketorolac   Injectable 30 milliGRAM(s) IV Push every 6 hours  lactated ringers. 1000 milliLiter(s) (125 mL/Hr) IV Continuous <Continuous>  oxyCODONE    IR 5 milliGRAM(s) Oral every 3 hours  oxytocin Infusion 41.667 milliUNIT(s)/Min (125 mL/Hr) IV Continuous <Continuous>  prenatal multivitamin 1 Tablet(s) Oral daily    MEDICATIONS  (PRN):  butorphanol Injectable 0.125 milliGRAM(s) IV Push every 6 hours PRN Pruritus  diphenhydrAMINE 25 milliGRAM(s) Oral every 6 hours PRN Itching  docusate sodium 100 milliGRAM(s) Oral two times a day PRN Stool Softening  glycerin Suppository - Adult 1 Suppository(s) Rectal at bedtime PRN Constipation  HYDROmorphone  Injectable 1 milliGRAM(s) IV Push every 3 hours PRN Severe Pain (7 - 10)  lanolin Ointment 1 Application(s) Topical every 3 hours PRN Sore Nipples  naloxone Injectable 0.1 milliGRAM(s) IV Push every 3 minutes PRN For ANY of the following changes in patient status:  A. RR LESS THAN 10 breaths per minute, B. Oxygen saturation LESS THAN 90%, C. Sedation score of 6  ondansetron Injectable 4 milliGRAM(s) IV Push every 6 hours PRN Nausea  oxyCODONE    IR 5 milliGRAM(s) Oral every 3 hours PRN Mild Pain (1 - 3)  simethicone 80 milliGRAM(s) Chew every 4 hours PRN Gas OB Progress Note:  Delivery, POD#1    S: 23y POD#1 s/p LTCS . Pt seen at the bedside for c/o SOB and difficulty taking deep breaths. Pt O2 Sat 93% on RA, HR 120s, Temp 37.9 F. Denies N/V, CP, fever, chills.  Pain is well controlled. Has not yet been out of bed.   Indwelling catheter is in place. Patient is breastfeeding.    O:   Vital Signs Last 24 Hrs  T(C): 37.9 (26 Oct 2018 03:45), Max: 37.9 (26 Oct 2018 03:45)  T(F): 100.2 (26 Oct 2018 03:45), Max: 100.2 (26 Oct 2018 03:45)  HR: 117 (26 Oct 2018 03:10) (84 - 117)  BP: 125/79 (26 Oct 2018 03:10) (123/84 - 135/82)  BP(mean): 96 (26 Oct 2018 01:00) (83 - 98)  RR: 20 (26 Oct 2018 03:45) (13 - 30)  SpO2: 95% (26 Oct 2018 03:45) (90% - 99%)    10-25-18 @ 07:01  -  10-26-18 @ 04:28  --------------------------------------------------------  IN: 3.27 mL/kg/hr / OUT: 1.93 mL/kg/hr / NET: 1.35 mL/kg/hr        PE:  General: NAD  Pulm: Clear to auscultation bilaterally  Cardio: Tachycardic to 120s and normal rhythm  Abdomen: Mildly distended, soft, appropriately tender, firm uterine fundus   Incision: clean, dry, intact w/ steri strips   : Indwelling catheter in place draining clear yellow urine  Extremities: No erythema, no pitting edema, non-tender    Labs:  Blood Type: B Positive  Antibody Screen: --  Rubella IgG: Positive (Positive=Immune, Negative=Non-Immune)  RPR: Negative                            12.6   9.67  )-----------( 227      ( 24 Oct 2018 13:45 )             38.9       Bedside FAST scan: Negative  Bedside Lung sono: B-lines  Portable Chest X-ray: Clear Lungs bilaterally      Medications:  MEDICATIONS  (STANDING):  acetaminophen   Tablet .. 975 milliGRAM(s) Oral every 6 hours  diphtheria/tetanus/pertussis (acellular) Vaccine (ADAcel) 0.5 milliLiter(s) IntraMuscular once  ertapenem  IVPB 1000 milliGRAM(s) IV Intermittent once  ertapenem  IVPB      ferrous    sulfate 325 milliGRAM(s) Oral daily  heparin  Injectable 5000 Unit(s) SubCutaneous every 12 hours  ibuprofen  Tablet. 600 milliGRAM(s) Oral every 6 hours  ketorolac   Injectable 30 milliGRAM(s) IV Push every 6 hours  lactated ringers. 1000 milliLiter(s) (125 mL/Hr) IV Continuous <Continuous>  oxyCODONE    IR 5 milliGRAM(s) Oral every 3 hours  oxytocin Infusion 41.667 milliUNIT(s)/Min (125 mL/Hr) IV Continuous <Continuous>  prenatal multivitamin 1 Tablet(s) Oral daily    MEDICATIONS  (PRN):  butorphanol Injectable 0.125 milliGRAM(s) IV Push every 6 hours PRN Pruritus  diphenhydrAMINE 25 milliGRAM(s) Oral every 6 hours PRN Itching  docusate sodium 100 milliGRAM(s) Oral two times a day PRN Stool Softening  glycerin Suppository - Adult 1 Suppository(s) Rectal at bedtime PRN Constipation  HYDROmorphone  Injectable 1 milliGRAM(s) IV Push every 3 hours PRN Severe Pain (7 - 10)  lanolin Ointment 1 Application(s) Topical every 3 hours PRN Sore Nipples  naloxone Injectable 0.1 milliGRAM(s) IV Push every 3 minutes PRN For ANY of the following changes in patient status:  A. RR LESS THAN 10 breaths per minute, B. Oxygen saturation LESS THAN 90%, C. Sedation score of 6  ondansetron Injectable 4 milliGRAM(s) IV Push every 6 hours PRN Nausea  oxyCODONE    IR 5 milliGRAM(s) Oral every 3 hours PRN Mild Pain (1 - 3)  simethicone 80 milliGRAM(s) Chew every 4 hours PRN Gas

## 2018-10-26 NOTE — PROGRESS NOTE ADULT - PROBLEM SELECTOR PLAN 1
-Monitor vitals  -f/u Blood/ urine cultures  -remove godinez when can ambulate  - Continue regular diet.  - Increase ambulation.  - Continue motrin, tylenol, oxycodone PRN for pain control.    - F/u AM CBC    Buck Piedra PGY-1

## 2018-10-26 NOTE — LACTATION INITIAL EVALUATION - LACTATION INTERVENTIONS
initiate skin to skin/initiate hand expression routine/assisted with deep latch and positioning  . discussed  signs  of  effective  feeding and  swallowing.  ..bbf  . demonstrated  strategies  to  bring  out  nipple .  hand e xpressed a nd  demonstrated   syring e  feeding  0.2 ml  given .

## 2018-10-26 NOTE — PROVIDER CONTACT NOTE (OTHER) - BACKGROUND
Patient delivered on 10/25 at 2157. Maternal temp in PACU was treated with ampicillin, gentamycin and tylenol.

## 2018-10-26 NOTE — PROVIDER CONTACT NOTE (OTHER) - SITUATION
Patient c/o of SOB and not being able to breathe while lying down. Vital signs: temp: 99.1, BP: 125/70, HR: 117, RR: 24, O2 Sat: 90

## 2018-10-26 NOTE — PROGRESS NOTE ADULT - SUBJECTIVE AND OBJECTIVE BOX
OB Progress Note:  Delivery, POD#1    S: 24yo POD#1 s/p LTCS for arrest complicated by an intrapartum temperature of 38.1. Pt was also found to have SOB/tachycardia and low O2 sat PPD0. Pt was ordered for Lung sono that showed B-lines and CXR which showed clear lungs. Pt has been feeling much better today. . Her pain is well controlled. She is tolerating a regular diet and passing flatus. Denies N/V. Denies CP/SOB/lightheadedness/dizziness.   Minimal ambulation  [place] in place  Denies heavy vaginal bleeding.    O:   Vital Signs Last 24 Hrs  T(C): 37.5 (26 Oct 2018 06:09), Max: 37.9 (26 Oct 2018 03:45)  T(F): 99.5 (26 Oct 2018 06:09), Max: 100.2 (26 Oct 2018 03:45)  HR: 110 (26 Oct 2018 06:09) (84 - 117)  BP: 125/64 (26 Oct 2018 06:09) (118/65 - 135/82)  BP(mean): 96 (26 Oct 2018 01:00) (83 - 98)  RR: 19 (26 Oct 2018 06:56) (13 - 30)  SpO2: 96% (26 Oct 2018 06:56) (90% - 100%)    Labs:  Blood type: B Positive  Rubella IgG: Positive (10-24 @ 13:45)  RPR: Negative                          10.7<L>   16.20<H> >-----------< 189    ( 10-26 @ 04:40 )             32.4<L>                        12.6   9.67 >-----------< 227    ( 10-24 @ 13:45 )             38.9    10-26-18 @ 04:40      141  |  106  |  11  ----------------------------<  98  4.3   |  22  |  0.87        Ca    7.9<L>      26 Oct 2018 04:40  Phos  3.8     10-26  Mg     1.4<L>     10-26    TPro  5.4<L>  /  Alb  2.4<L>  /  TBili  0.3  /  DBili  x   /  AST  29  /  ALT  16  /  AlkPhos  100  10-26-18 @ 04:40          PE:  General: NAD  Abdomen: Mildly distended, appropriately tender, incision c/d/i.  Extremities: No erythema, no pitting edema

## 2018-10-26 NOTE — LACTATION INITIAL EVALUATION - INTERVENTION OUTCOME
verbalizes understanding/rn  aware  ,  mother  encouraged  to ask  for  assistance  when  nbn  showing   cues,/resolved

## 2018-10-26 NOTE — PROGRESS NOTE ADULT - ASSESSMENT
A/P:24yo POD#1 s/p LTCS for arrest complicated by an intrapartum temperature of 38.1. Pt was also found to have SOB/tachycardia and low O2 sat PPD0. Pt was ordered for Lung sono that showed B-lines and CXR which showed clear lungs. Pt has been feeling much better today.  Patient is stable and doing well post-operatively.

## 2018-10-26 NOTE — PROGRESS NOTE ADULT - PROBLEM SELECTOR PLAN 1
- Invanz  - Blood cx, Urine cx  - CBC, CMP, Coags, Lactate  - Stop IVF  - Encourage incentive spirometer use  - Continue regular diet.  - Increase ambulation.  - Continue motrin, tylenol, oxycodone PRN for pain control.   - Discontinue godinez at 24 hrs post-op      Stephani Vu, PGY-1 - Invanz  - Blood cx, Urine cx  - CBC, CMP, Coags, Lactate  - Stop IVF  - Encourage incentive spirometer use  - Continue regular diet.  - Increase ambulation.  - Continue motrin, tylenol, oxycodone PRN for pain control.   - Discontinue godinez at 24 hrs post-op    Stephani Vu, PGY-1 - Invanz  - Blood cx, Urine cx  - CBC, CMP, Coags, Lactate  - Stop IVF  -2L O2 via NC until 6a, then wean off O2  - Encourage incentive spirometer use  - Continue regular diet.  - Increase ambulation.  - Continue motrin, tylenol, oxycodone PRN for pain control.   - Discontinue godinez at 24 hrs post-op    Stephani Vu, PGY-1

## 2018-10-26 NOTE — PROGRESS NOTE ADULT - ASSESSMENT
A/P: 23y POD#1 s/p LTCS.  Patient w/ low O2 saturation A/P: 23y POD#1 s/p LTCS.  Patient w/ low O2 saturation, tachycardia, and is afebrile. Lung sono suggestive of pulmonary edema, but Chest Xray negative.

## 2018-10-27 ENCOUNTER — NON-APPOINTMENT (OUTPATIENT)
Age: 23
End: 2018-10-27

## 2018-10-27 PROBLEM — Z34.03 ENCOUNTER FOR SUPERVISION OF NORMAL FIRST PREGNANCY IN THIRD TRIMESTER: Status: ACTIVE | Noted: 2018-08-15

## 2018-10-27 LAB
BACTERIA UR CULT: SIGNIFICANT CHANGE UP
GP B STREP DNA SPEC QL NAA+PROBE: NORMAL
GP B STREP DNA SPEC QL NAA+PROBE: NOT DETECTED
SOURCE GBS: NORMAL
SPECIMEN SOURCE: SIGNIFICANT CHANGE UP
SPECIMEN SOURCE: SIGNIFICANT CHANGE UP

## 2018-10-27 RX ADMIN — Medication 100 MILLIGRAM(S): at 11:43

## 2018-10-27 RX ADMIN — Medication 600 MILLIGRAM(S): at 18:40

## 2018-10-27 RX ADMIN — Medication 325 MILLIGRAM(S): at 11:43

## 2018-10-27 RX ADMIN — Medication 975 MILLIGRAM(S): at 06:10

## 2018-10-27 RX ADMIN — Medication 600 MILLIGRAM(S): at 23:43

## 2018-10-27 RX ADMIN — Medication 975 MILLIGRAM(S): at 12:40

## 2018-10-27 RX ADMIN — SIMETHICONE 80 MILLIGRAM(S): 80 TABLET, CHEWABLE ORAL at 23:44

## 2018-10-27 RX ADMIN — Medication 975 MILLIGRAM(S): at 18:40

## 2018-10-27 RX ADMIN — Medication 600 MILLIGRAM(S): at 06:29

## 2018-10-27 RX ADMIN — Medication 975 MILLIGRAM(S): at 06:29

## 2018-10-27 RX ADMIN — Medication 600 MILLIGRAM(S): at 12:40

## 2018-10-27 RX ADMIN — Medication 975 MILLIGRAM(S): at 17:45

## 2018-10-27 RX ADMIN — HEPARIN SODIUM 5000 UNIT(S): 5000 INJECTION INTRAVENOUS; SUBCUTANEOUS at 17:46

## 2018-10-27 RX ADMIN — Medication 975 MILLIGRAM(S): at 11:43

## 2018-10-27 RX ADMIN — SIMETHICONE 80 MILLIGRAM(S): 80 TABLET, CHEWABLE ORAL at 11:43

## 2018-10-27 RX ADMIN — Medication 600 MILLIGRAM(S): at 11:43

## 2018-10-27 RX ADMIN — Medication 600 MILLIGRAM(S): at 06:10

## 2018-10-27 RX ADMIN — HEPARIN SODIUM 5000 UNIT(S): 5000 INJECTION INTRAVENOUS; SUBCUTANEOUS at 06:09

## 2018-10-27 RX ADMIN — Medication 975 MILLIGRAM(S): at 23:43

## 2018-10-27 RX ADMIN — Medication 600 MILLIGRAM(S): at 17:46

## 2018-10-27 RX ADMIN — Medication 1 TABLET(S): at 11:43

## 2018-10-27 NOTE — PROGRESS NOTE ADULT - ATTENDING COMMENTS
Agree with assessment and plan. Discharge planning
Patient seen and examined by me.  Agree with above assessment and plan

## 2018-10-27 NOTE — PROGRESS NOTE ADULT - PROVIDER SPECIALTY LIST ADULT
Adult Ventilation Update    Total Vent Days: 5    Patient Lines/Drains/Airways Status    Active Airway     Name: Placement date: Placement time: Site: Days:    Airway Group ET Tube Oral 8.0 05/25/17    Oral  5                 APVcmv  24  /  440  /  +12  /  40%  DUO q4                     Plateau Pressure (Q Shift): 24 (05/29/17 1858)  Static Compliance (ml / cm H2O): 36.8 (05/30/17 0241)    Patient failed trials because of Barriers to Wean: FiO2 >50% OR PEEP >8 (PMA Only) (05/29/17 1858)  Barriers to SBT    Length of Weaning Trial          Sputum/Suction   Cough: Non Productive (05/30/17 0241)  Sputum Amount: Unable to Evaluate (05/30/17 0241)  Sputum Color: Unable to Evaluate (05/30/17 0241)  Sputum Consistency: Unable to Evaluate (05/30/17 0241)    Mobility Group  Activity Performed: Unable to mobilize (05/30/17 0000)  Pt Calls for Assistance: No (05/30/17 0000)  Assistive Devices: None (05/29/17 0600)  Reason Not Mobilized: Unstable condition (Peep- 12) (05/30/17 0000)  Mobilization Comments: PEEP of 12 (05/29/17 1200)    Events/Summary/Plan: vent check and in line med given (05/29/17 1512)       OB

## 2018-10-27 NOTE — PROGRESS NOTE ADULT - ASSESSMENT
A/P: 22yo POD#2 s/p LTCS.  Patient is stable and doing well post-operatively.  Tachycardia resolving. No longer requiring oxygen.

## 2018-10-27 NOTE — PROGRESS NOTE ADULT - PROBLEM SELECTOR PLAN 1
- Continue regular diet.  - Increase ambulation.  - Continue motrin, tylenol, oxycodone PRN for pain control.    - F/u blood and urine cultures    Lesly Munroe PGY-1  Pager #: [phone]

## 2018-10-27 NOTE — PROGRESS NOTE ADULT - SUBJECTIVE AND OBJECTIVE BOX
OB Progress Note:  Delivery, POD#2    S: 22yo POD#2 s/p LTCS . Her pain is well controlled. She is tolerating a regular diet and passing flatus. Denies N/V. Denies CP/SOB/lightheadedness/dizziness.   She is ambulating without difficulty. Voiding spontanously.     O:   Vital Signs Last 24 Hrs  T(C): 37.1 (27 Oct 2018 04:33), Max: 37.9 (26 Oct 2018 17:57)  T(F): 98.8 (27 Oct 2018 04:33), Max: 100.2 (26 Oct 2018 17:57)  HR: 105 (27 Oct 2018 04:33) (100 - 112)  BP: 130/76 (26 Oct 2018 21:35) (96/58 - 136/74)  RR: 17 (26 Oct 2018 21:35) (17 - 20)  SpO2: 97% (26 Oct 2018 21:35) (96% - 98%)    Labs:  Blood type: B Positive  Rubella IgG: Positive (10-24 @ 13:45)  RPR: Negative                          10.7<L>   16.20<H> >-----------< 189    ( 10-26 @ 04:40 )             32.4<L>                        12.6   9.67 >-----------< 227    ( 10-24 @ 13:45 )             38.9    10-26-18 @ 04:40      141  |  106  |  11  ----------------------------<  98  4.3   |  22  |  0.87        Ca    7.9<L>      26 Oct 2018 04:40  Phos  3.8     10-26  Mg     1.4<L>     10-26    TPro  5.4<L>  /  Alb  2.4<L>  /  TBili  0.3  /  DBili  x   /  AST  29  /  ALT  16  /  AlkPhos  100  10-26-18 @ 04:40    PE:  General: NAD  Abdomen: Mildly distended, appropriately tender, incision c/d/i.  Extremities: No erythema, no pitting edema

## 2018-10-28 VITALS
HEART RATE: 67 BPM | RESPIRATION RATE: 18 BRPM | DIASTOLIC BLOOD PRESSURE: 62 MMHG | OXYGEN SATURATION: 98 % | TEMPERATURE: 98 F | SYSTOLIC BLOOD PRESSURE: 115 MMHG

## 2018-10-28 RX ADMIN — Medication 600 MILLIGRAM(S): at 14:05

## 2018-10-28 RX ADMIN — Medication 600 MILLIGRAM(S): at 05:41

## 2018-10-28 RX ADMIN — Medication 975 MILLIGRAM(S): at 15:00

## 2018-10-28 RX ADMIN — HEPARIN SODIUM 5000 UNIT(S): 5000 INJECTION INTRAVENOUS; SUBCUTANEOUS at 05:41

## 2018-10-28 RX ADMIN — Medication 325 MILLIGRAM(S): at 14:05

## 2018-10-28 RX ADMIN — Medication 1 TABLET(S): at 14:05

## 2018-10-28 RX ADMIN — Medication 975 MILLIGRAM(S): at 01:12

## 2018-10-28 RX ADMIN — Medication 975 MILLIGRAM(S): at 14:05

## 2018-10-28 RX ADMIN — Medication 600 MILLIGRAM(S): at 01:12

## 2018-10-28 RX ADMIN — Medication 975 MILLIGRAM(S): at 05:42

## 2018-10-28 RX ADMIN — Medication 600 MILLIGRAM(S): at 15:00

## 2018-10-28 NOTE — PROGRESS NOTE ADULT - SUBJECTIVE AND OBJECTIVE BOX
Postpartum Note,  Section     23y year old woman post-operative day 3 from [facility].  No acute events overnight.  Patient has no complaints and pain is well-controlled.  Patient is tolerating regular diet, passing flatus, ambulating, and is voiding spontaneously.  Postpartum bleeding is well controlled. Patient denies fever or chills. Patient denies lightheadedness, shortness of breath, chest pain, and palpitations.     Physical exam:    Vital Signs Last 24 Hrs  T(C): 36.8 (28 Oct 2018 06:09), Max: 37.1 (27 Oct 2018 21:19)  T(F): 98.2 (28 Oct 2018 06:09), Max: 98.8 (27 Oct 2018 21:19)  HR: 67 (28 Oct 2018 06:09) (67 - 91)  BP: 115/62 (28 Oct 2018 06:09) (115/62 - 122/75)  BP(mean): --  RR: 18 (28 Oct 2018 06:09) (18 - 18)  SpO2: 98% (28 Oct 2018 06:09) (98% - 99%)    Gen: NAD  Abdomen: Soft, nontender, no distension , firm uterine fundus at umbilicus.  Incision: Clean, dry, and intact  Pelvic: Normal lochia noted  Ext: No calf tenderness    LABS:      10-26-18 @ 04:40      141  |  106  |  11  ----------------------------<  98  4.3   |  22  |  0.87        Ca    7.9<L>      26 Oct 2018 04:40  Phos  3.8     10-26  Mg     1.4<L>     10-26    TPro  5.4<L>  /  Alb  2.4<L>  /  TBili  0.3  /  DBili  x   /  AST  29  /  ALT  16  /  AlkPhos  100  10-26-18 @ 04:40          acetaminophen   Tablet .. 975 milliGRAM(s) Oral every 6 hours  diphenhydrAMINE 25 milliGRAM(s) Oral every 6 hours PRN  diphtheria/tetanus/pertussis (acellular) Vaccine (ADAcel) 0.5 milliLiter(s) IntraMuscular once  docusate sodium 100 milliGRAM(s) Oral two times a day PRN  ferrous    sulfate 325 milliGRAM(s) Oral daily  glycerin Suppository - Adult 1 Suppository(s) Rectal at bedtime PRN  heparin  Injectable 5000 Unit(s) SubCutaneous every 12 hours  ibuprofen  Tablet. 600 milliGRAM(s) Oral every 6 hours  lanolin Ointment 1 Application(s) Topical every 3 hours PRN  oxyCODONE    IR 5 milliGRAM(s) Oral every 3 hours  prenatal multivitamin 1 Tablet(s) Oral daily  simethicone 80 milliGRAM(s) Chew every 4 hours PRN

## 2018-10-28 NOTE — PROGRESS NOTE ADULT - PROBLEM SELECTOR PLAN 1
- Continue regular diet.  - Increase ambulation.  - Continue motrin, tylenol, oxycodone PRN for pain control.    - F/u blood and urine cultures  - Discharge planning    VWhite PGY1

## 2018-10-28 NOTE — PROGRESS NOTE ADULT - ASSESSMENT
A/P: 22yo POD#3 s/p LTCS. Peripartum course complicated by IPT. Patient is stable and doing well post-operatively.  Afebrile > 48h. Tachycardia resolved, patient saturating well on room air. BCx and UCx both NGTD.

## 2018-10-31 LAB — BACTERIA BLD CULT: SIGNIFICANT CHANGE UP

## 2018-11-01 ENCOUNTER — INPATIENT (INPATIENT)
Facility: HOSPITAL | Age: 23
LOS: 3 days | Discharge: HOME | End: 2018-11-05
Attending: OBSTETRICS & GYNECOLOGY | Admitting: OBSTETRICS & GYNECOLOGY
Payer: COMMERCIAL

## 2018-11-01 ENCOUNTER — OTHER (OUTPATIENT)
Age: 23
End: 2018-11-01

## 2018-11-01 VITALS
SYSTOLIC BLOOD PRESSURE: 133 MMHG | TEMPERATURE: 100 F | HEART RATE: 118 BPM | DIASTOLIC BLOOD PRESSURE: 80 MMHG | OXYGEN SATURATION: 99 % | RESPIRATION RATE: 16 BRPM

## 2018-11-01 DIAGNOSIS — R68.83 CHILLS (WITHOUT FEVER): ICD-10-CM

## 2018-11-01 DIAGNOSIS — R65.10 SYSTEMIC INFLAMMATORY RESPONSE SYNDROME (SIRS) OF NON-INFECTIOUS ORIGIN WITHOUT ACUTE ORGAN DYSFUNCTION: ICD-10-CM

## 2018-11-01 DIAGNOSIS — Z29.9 ENCOUNTER FOR PROPHYLACTIC MEASURES, UNSPECIFIED: ICD-10-CM

## 2018-11-01 DIAGNOSIS — R50.9 FEVER, UNSPECIFIED: ICD-10-CM

## 2018-11-01 DIAGNOSIS — Z98.891 HISTORY OF UTERINE SCAR FROM PREVIOUS SURGERY: Chronic | ICD-10-CM

## 2018-11-01 LAB
ALBUMIN SERPL ELPH-MCNC: 3.1 G/DL — LOW (ref 3.3–5)
ALP SERPL-CCNC: 84 U/L — SIGNIFICANT CHANGE UP (ref 40–120)
ALT FLD-CCNC: 16 U/L — SIGNIFICANT CHANGE UP (ref 4–33)
APPEARANCE UR: SIGNIFICANT CHANGE UP
AST SERPL-CCNC: 22 U/L — SIGNIFICANT CHANGE UP (ref 4–32)
B PERT DNA SPEC QL NAA+PROBE: SIGNIFICANT CHANGE UP
BACTERIA # UR AUTO: NEGATIVE — SIGNIFICANT CHANGE UP
BASE EXCESS BLDV CALC-SCNC: -0.6 MMOL/L — SIGNIFICANT CHANGE UP
BASOPHILS # BLD AUTO: 0.03 K/UL — SIGNIFICANT CHANGE UP (ref 0–0.2)
BASOPHILS NFR BLD AUTO: 0.4 % — SIGNIFICANT CHANGE UP (ref 0–2)
BASOPHILS NFR SPEC: 1.8 % — SIGNIFICANT CHANGE UP (ref 0–2)
BILIRUB SERPL-MCNC: < 0.2 MG/DL — LOW (ref 0.2–1.2)
BILIRUB UR-MCNC: NEGATIVE — SIGNIFICANT CHANGE UP
BLASTS # FLD: 0 % — SIGNIFICANT CHANGE UP (ref 0–0)
BLOOD GAS VENOUS - CREATININE: 0.74 MG/DL — SIGNIFICANT CHANGE UP (ref 0.5–1.3)
BLOOD UR QL VISUAL: HIGH
BUN SERPL-MCNC: 14 MG/DL — SIGNIFICANT CHANGE UP (ref 7–23)
C PNEUM DNA SPEC QL NAA+PROBE: NOT DETECTED — SIGNIFICANT CHANGE UP
CALCIUM SERPL-MCNC: 8.8 MG/DL — SIGNIFICANT CHANGE UP (ref 8.4–10.5)
CHLORIDE BLDV-SCNC: 110 MMOL/L — HIGH (ref 96–108)
CHLORIDE SERPL-SCNC: 103 MMOL/L — SIGNIFICANT CHANGE UP (ref 98–107)
CO2 SERPL-SCNC: 20 MMOL/L — LOW (ref 22–31)
COLOR SPEC: SIGNIFICANT CHANGE UP
CREAT SERPL-MCNC: 0.83 MG/DL — SIGNIFICANT CHANGE UP (ref 0.5–1.3)
EOSINOPHIL # BLD AUTO: 0.08 K/UL — SIGNIFICANT CHANGE UP (ref 0–0.5)
EOSINOPHIL NFR BLD AUTO: 1 % — SIGNIFICANT CHANGE UP (ref 0–6)
EOSINOPHIL NFR FLD: 2.6 % — SIGNIFICANT CHANGE UP (ref 0–6)
FLUAV H1 2009 PAND RNA SPEC QL NAA+PROBE: NOT DETECTED — SIGNIFICANT CHANGE UP
FLUAV H1 RNA SPEC QL NAA+PROBE: NOT DETECTED — SIGNIFICANT CHANGE UP
FLUAV H3 RNA SPEC QL NAA+PROBE: NOT DETECTED — SIGNIFICANT CHANGE UP
FLUAV SUBTYP SPEC NAA+PROBE: SIGNIFICANT CHANGE UP
FLUBV RNA SPEC QL NAA+PROBE: NOT DETECTED — SIGNIFICANT CHANGE UP
GAS PNL BLDV: 136 MMOL/L — SIGNIFICANT CHANGE UP (ref 136–146)
GIANT PLATELETS BLD QL SMEAR: PRESENT — SIGNIFICANT CHANGE UP
GLUCOSE BLDV-MCNC: 83 — SIGNIFICANT CHANGE UP (ref 70–99)
GLUCOSE SERPL-MCNC: 89 MG/DL — SIGNIFICANT CHANGE UP (ref 70–99)
GLUCOSE UR-MCNC: NEGATIVE — SIGNIFICANT CHANGE UP
HADV DNA SPEC QL NAA+PROBE: NOT DETECTED — SIGNIFICANT CHANGE UP
HCO3 BLDV-SCNC: 24 MMOL/L — SIGNIFICANT CHANGE UP (ref 20–27)
HCOV 229E RNA SPEC QL NAA+PROBE: NOT DETECTED — SIGNIFICANT CHANGE UP
HCOV HKU1 RNA SPEC QL NAA+PROBE: NOT DETECTED — SIGNIFICANT CHANGE UP
HCOV NL63 RNA SPEC QL NAA+PROBE: NOT DETECTED — SIGNIFICANT CHANGE UP
HCOV OC43 RNA SPEC QL NAA+PROBE: NOT DETECTED — SIGNIFICANT CHANGE UP
HCT VFR BLD CALC: 32.5 % — LOW (ref 34.5–45)
HCT VFR BLDV CALC: 33.7 % — LOW (ref 34.5–45)
HGB BLD-MCNC: 10.6 G/DL — LOW (ref 11.5–15.5)
HGB BLDV-MCNC: 10.9 G/DL — LOW (ref 11.5–15.5)
HMPV RNA SPEC QL NAA+PROBE: NOT DETECTED — SIGNIFICANT CHANGE UP
HPIV1 RNA SPEC QL NAA+PROBE: NOT DETECTED — SIGNIFICANT CHANGE UP
HPIV2 RNA SPEC QL NAA+PROBE: NOT DETECTED — SIGNIFICANT CHANGE UP
HPIV3 RNA SPEC QL NAA+PROBE: NOT DETECTED — SIGNIFICANT CHANGE UP
HPIV4 RNA SPEC QL NAA+PROBE: NOT DETECTED — SIGNIFICANT CHANGE UP
HYALINE CASTS # UR AUTO: SIGNIFICANT CHANGE UP
IMM GRANULOCYTES # BLD AUTO: 0.34 # — SIGNIFICANT CHANGE UP
IMM GRANULOCYTES NFR BLD AUTO: 4 % — HIGH (ref 0–1.5)
KETONES UR-MCNC: NEGATIVE — SIGNIFICANT CHANGE UP
LACTATE BLDV-MCNC: 1.2 MMOL/L — SIGNIFICANT CHANGE UP (ref 0.5–2)
LEUKOCYTE ESTERASE UR-ACNC: SIGNIFICANT CHANGE UP
LYMPHOCYTES # BLD AUTO: 0.88 K/UL — LOW (ref 1–3.3)
LYMPHOCYTES # BLD AUTO: 10.5 % — LOW (ref 13–44)
LYMPHOCYTES NFR SPEC AUTO: 6.2 % — LOW (ref 13–44)
M PNEUMO DNA SPEC QL NAA+PROBE: NOT DETECTED — SIGNIFICANT CHANGE UP
MANUAL SMEAR VERIFICATION: SIGNIFICANT CHANGE UP
MCHC RBC-ENTMCNC: 28.3 PG — SIGNIFICANT CHANGE UP (ref 27–34)
MCHC RBC-ENTMCNC: 32.6 % — SIGNIFICANT CHANGE UP (ref 32–36)
MCV RBC AUTO: 86.7 FL — SIGNIFICANT CHANGE UP (ref 80–100)
METAMYELOCYTES # FLD: 0 % — SIGNIFICANT CHANGE UP (ref 0–1)
MONOCYTES # BLD AUTO: 0.43 K/UL — SIGNIFICANT CHANGE UP (ref 0–0.9)
MONOCYTES NFR BLD AUTO: 5.1 % — SIGNIFICANT CHANGE UP (ref 2–14)
MONOCYTES NFR BLD: 2.6 % — SIGNIFICANT CHANGE UP (ref 2–9)
MORPHOLOGY BLD-IMP: NORMAL — SIGNIFICANT CHANGE UP
MYELOCYTES NFR BLD: 0.9 % — HIGH (ref 0–0)
NEUTROPHIL AB SER-ACNC: 82.3 % — HIGH (ref 43–77)
NEUTROPHILS # BLD AUTO: 6.65 K/UL — SIGNIFICANT CHANGE UP (ref 1.8–7.4)
NEUTROPHILS NFR BLD AUTO: 79 % — HIGH (ref 43–77)
NEUTS BAND # BLD: 1.8 % — SIGNIFICANT CHANGE UP (ref 0–6)
NITRITE UR-MCNC: NEGATIVE — SIGNIFICANT CHANGE UP
NRBC # FLD: 0 — SIGNIFICANT CHANGE UP
OTHER - HEMATOLOGY %: 0 — SIGNIFICANT CHANGE UP
PCO2 BLDV: 36 MMHG — LOW (ref 41–51)
PH BLDV: 7.43 PH — SIGNIFICANT CHANGE UP (ref 7.32–7.43)
PH UR: 6.5 — SIGNIFICANT CHANGE UP (ref 5–8)
PLATELET # BLD AUTO: 205 K/UL — SIGNIFICANT CHANGE UP (ref 150–400)
PLATELET COUNT - ESTIMATE: NORMAL — SIGNIFICANT CHANGE UP
PMV BLD: 8.6 FL — SIGNIFICANT CHANGE UP (ref 7–13)
PO2 BLDV: 58 MMHG — HIGH (ref 35–40)
POTASSIUM BLDV-SCNC: 3.8 MMOL/L — SIGNIFICANT CHANGE UP (ref 3.4–4.5)
POTASSIUM SERPL-MCNC: 4.1 MMOL/L — SIGNIFICANT CHANGE UP (ref 3.5–5.3)
POTASSIUM SERPL-SCNC: 4.1 MMOL/L — SIGNIFICANT CHANGE UP (ref 3.5–5.3)
PROMYELOCYTES # FLD: 0 % — SIGNIFICANT CHANGE UP (ref 0–0)
PROT SERPL-MCNC: 7 G/DL — SIGNIFICANT CHANGE UP (ref 6–8.3)
PROT UR-MCNC: 10 — SIGNIFICANT CHANGE UP
RBC # BLD: 3.75 M/UL — LOW (ref 3.8–5.2)
RBC # FLD: 14.6 % — HIGH (ref 10.3–14.5)
RBC CASTS # UR COMP ASSIST: SIGNIFICANT CHANGE UP (ref 0–?)
RSV RNA SPEC QL NAA+PROBE: NOT DETECTED — SIGNIFICANT CHANGE UP
RV+EV RNA SPEC QL NAA+PROBE: NOT DETECTED — SIGNIFICANT CHANGE UP
SAO2 % BLDV: 87.9 % — HIGH (ref 60–85)
SMUDGE CELLS # BLD: PRESENT — SIGNIFICANT CHANGE UP
SODIUM SERPL-SCNC: 139 MMOL/L — SIGNIFICANT CHANGE UP (ref 135–145)
SP GR SPEC: 1.01 — SIGNIFICANT CHANGE UP (ref 1–1.04)
SQUAMOUS # UR AUTO: SIGNIFICANT CHANGE UP
UROBILINOGEN FLD QL: NORMAL — SIGNIFICANT CHANGE UP
VARIANT LYMPHS # BLD: 1.8 % — SIGNIFICANT CHANGE UP
WBC # BLD: 8.41 K/UL — SIGNIFICANT CHANGE UP (ref 3.8–10.5)
WBC # FLD AUTO: 8.41 K/UL — SIGNIFICANT CHANGE UP (ref 3.8–10.5)
WBC UR QL: HIGH (ref 0–?)

## 2018-11-01 PROCEDURE — 71046 X-RAY EXAM CHEST 2 VIEWS: CPT | Mod: 26

## 2018-11-01 PROCEDURE — 99244 OFF/OP CNSLTJ NEW/EST MOD 40: CPT

## 2018-11-01 PROCEDURE — 93010 ELECTROCARDIOGRAM REPORT: CPT

## 2018-11-01 PROCEDURE — 99223 1ST HOSP IP/OBS HIGH 75: CPT | Mod: GC

## 2018-11-01 PROCEDURE — 76856 US EXAM PELVIC COMPLETE: CPT | Mod: 26

## 2018-11-01 RX ORDER — PIPERACILLIN AND TAZOBACTAM 4; .5 G/20ML; G/20ML
3.38 INJECTION, POWDER, LYOPHILIZED, FOR SOLUTION INTRAVENOUS EVERY 8 HOURS
Qty: 0 | Refills: 0 | Status: DISCONTINUED | OUTPATIENT
Start: 2018-11-02 | End: 2018-11-05

## 2018-11-01 RX ORDER — SODIUM CHLORIDE 9 MG/ML
1000 INJECTION INTRAMUSCULAR; INTRAVENOUS; SUBCUTANEOUS ONCE
Qty: 0 | Refills: 0 | Status: COMPLETED | OUTPATIENT
Start: 2018-11-01 | End: 2018-11-01

## 2018-11-01 RX ORDER — ENOXAPARIN SODIUM 100 MG/ML
40 INJECTION SUBCUTANEOUS DAILY
Qty: 0 | Refills: 0 | Status: DISCONTINUED | OUTPATIENT
Start: 2018-11-01 | End: 2018-11-05

## 2018-11-01 RX ORDER — ACETAMINOPHEN 500 MG
650 TABLET ORAL ONCE
Qty: 0 | Refills: 0 | Status: COMPLETED | OUTPATIENT
Start: 2018-11-01 | End: 2018-11-01

## 2018-11-01 RX ORDER — PIPERACILLIN AND TAZOBACTAM 4; .5 G/20ML; G/20ML
3.38 INJECTION, POWDER, LYOPHILIZED, FOR SOLUTION INTRAVENOUS ONCE
Qty: 0 | Refills: 0 | Status: COMPLETED | OUTPATIENT
Start: 2018-11-01 | End: 2018-11-01

## 2018-11-01 RX ADMIN — Medication 100 MILLIGRAM(S): at 16:31

## 2018-11-01 RX ADMIN — Medication 650 MILLIGRAM(S): at 18:04

## 2018-11-01 RX ADMIN — Medication 900 MILLIGRAM(S): at 17:01

## 2018-11-01 RX ADMIN — PIPERACILLIN AND TAZOBACTAM 200 GRAM(S): 4; .5 INJECTION, POWDER, LYOPHILIZED, FOR SOLUTION INTRAVENOUS at 19:39

## 2018-11-01 RX ADMIN — SODIUM CHLORIDE 1000 MILLILITER(S): 9 INJECTION INTRAMUSCULAR; INTRAVENOUS; SUBCUTANEOUS at 19:39

## 2018-11-01 RX ADMIN — SODIUM CHLORIDE 1000 MILLILITER(S): 9 INJECTION INTRAMUSCULAR; INTRAVENOUS; SUBCUTANEOUS at 14:36

## 2018-11-01 RX ADMIN — SODIUM CHLORIDE 1000 MILLILITER(S): 9 INJECTION INTRAMUSCULAR; INTRAVENOUS; SUBCUTANEOUS at 15:36

## 2018-11-01 NOTE — ED PROVIDER NOTE - PROGRESS NOTE DETAILS
ID recs zosyn as coverage, will see tomorrow. Confirmed with OBGYN, not concerned for endometritis in this patient with US result.   Cxr neg. ua neg. RVP pending. Pt still spiking fevers and tachy in ED. Admit for fever of unknown orgin.   Admit to [facility]. MAr text paged.

## 2018-11-01 NOTE — H&P ADULT - HISTORY OF PRESENT ILLNESS
23F  with no past medical history, presents 1 week post  section with fevers for past 24 hours. Patient states that last night, she felt chills, and shortly after developed a fever. She took 2 motrins and 2 tylenols overnight. This morning, she again felt chills and had a fever, and this time she measured it reading 103.8, promoting her to come to the ED. She endorses she had fevers and chills prior to leaving the hospital, but on day of discharge was afebrile. Postpartum, patient states she had been having a productive cough with white sputum, that has remained largely unchanged. She endorses shortness of breath with the cough. She endorses some dysuria, denies hematuria, and states she has mild pain at the site of the surgical scar. She also endorses some back pain which started prior to delivery.    Patient states she had an uneventful prenancy. She had premature rupture of membranes and was given ampicillin and gentamicin prior to . On POD1, patient developed fevers and hypoxia to 93%, and was given ertapenem 1 gram x 1 dose. Blood cultures and urine cultures were negative prior to discharge. Patient states she has some minimal vaginal discharge at this time and is breast feeding the baby.     In the ED, patient's /80; ; T 100.3- Tmax 102.8, O2 sat 99% on room air. Patient given clinda and zosyn in the ED.

## 2018-11-01 NOTE — H&P ADULT - FAMILY HISTORY
Father  Still living? Unknown  Family history of diabetes mellitus, Age at diagnosis: Age Unknown  Family history of hypertension, Age at diagnosis: Age Unknown     Mother  Still living? Unknown  Family history of diabetes mellitus, Age at diagnosis: Age Unknown  Family history of hypertension, Age at diagnosis: Age Unknown

## 2018-11-01 NOTE — H&P ADULT - ASSESSMENT
23F  with no past medical history, presents 1 week post  section with fevers for past 24 hours, admitted for SIRS with unknown etiology of infection.

## 2018-11-01 NOTE — H&P ADULT - PROBLEM SELECTOR PLAN 2
- patient postpartum 1 week  -  scar healing appropriately and lochia minimal at this time  - will contact OB to provide breast pump as patient breastfeeding her child  - unknown effects of zosyn on breast milk- will advise to discard milk

## 2018-11-01 NOTE — ED PROVIDER NOTE - ATTENDING CONTRIBUTION TO CARE
23F presents with fever 1wk s/p . Minimal abd pain, unchanged vaginal bleeding. No n/v. Fever started last night. + dysuria and cough. No vaginal discharge. On exam well appearing, nad, mmm, lungs clear, rrr, abd minimal suprapubic ttp, no rebound, 2+ pulses, no edema, no rash, speech clear, no focal neuro deficits.  Plan for labs, ultrasound and OB consult.

## 2018-11-01 NOTE — ED ADULT NURSE NOTE - OBJECTIVE STATEMENT
Pt rec'd to ED R#12 Aox4, in nad, C/O fevers (tmax 103 today), chills, nausea, cough, headache since C Section 1 week ago. Denies warmth/ redness to breast or issues with breastfeeding, denies CP/ SOB/ vomiting/ dysuria/ other acute complaints. Took advil and motrin prior to arrival in ED.

## 2018-11-01 NOTE — H&P ADULT - NSHPREVIEWOFSYSTEMS_GEN_ALL_CORE
REVIEW OF SYSTEMS:    CONSTITUTIONAL: +weakness, +fevers or chills  EYES/ENT: No visual changes;  No vertigo or throat pain   NECK: No pain or stiffness  RESPIRATORY: + cough, no wheezing, hemoptysis; No shortness of breath  CARDIOVASCULAR: No chest pain or palpitations  GASTROINTESTINAL: No abdominal or epigastric pain. No nausea, vomiting, or hematemesis; No diarrhea or constipation. No melena or hematochezia.  GENITOURINARY: No dysuria, frequency or hematuria  NEUROLOGICAL: + tingling in right fingers; No numbness or weakness  SKIN: No itching, rashes

## 2018-11-01 NOTE — ED ADULT TRIAGE NOTE - CHIEF COMPLAINT QUOTE
Pt had a  1 week ago, complaining of fever, chills and pain at the incision site. Pt denies any drainage from site, N/V/D.

## 2018-11-01 NOTE — ED PROVIDER NOTE - MEDICAL DECISION MAKING DETAILS
22 yo F with no sig PMHx Pt is s/p  1 week ago, reports fever while in hospital but then was discharged after being afebrile presents to the ED with body aches, fatigue, chills and fever, began yesterday, Pt endorses tmax 103.8 this am around 1030am, at that time pt took 2 Tylenol tabs and 2 Motrin tabs. Pt states she had been feeling well, until yesterday. Pt reports dysuria and mild phlegm producing cough x days.  Pt febrile and tachycardic, low grade.   Concern for endometritis with recent history, will r/o other cause of fever and chill, r/o uti r/o pulmonary process.   Labs, TVUS, UA, cultures, cxr.

## 2018-11-01 NOTE — ED PROVIDER NOTE - GASTROINTESTINAL NEGATIVE STATEMENT, MLM
pain at c section site, but unchanged from discharged, no bloating, no constipation, no diarrhea, no nausea and no vomiting.

## 2018-11-01 NOTE — CONSULT NOTE ADULT - SUBJECTIVE AND OBJECTIVE BOX
GYN Consult Note    23y  s/p C/S on 10/25 presents with   HPI:      OB/GYN HISTORY:  - s/p TOP x4 w/ D&C x2. C/S 10/25 for arrest at 8.5cm w/ chorioamnionitis. Denies any uterine fibroids, ovarian cysts, abnormal pap smears.     Name of GYN Physician: Dr. Reyes  History of Abnormal Pap: denies        PAST MEDICAL & SURGICAL HISTORY:  No pertinent past medical history  H/O  section      REVIEW OF SYSTEMS        	    MEDICATIONS  (STANDING):  clindamycin IVPB 900 milliGRAM(s) IV Intermittent Once    MEDICATIONS  (PRN):      Allergies    No Known Drug Allergies  Seafood (Hives)    Intolerances        SOCIAL HISTORY: denies any alcohol, drugs or tobacco	    FAMILY HISTORY: HTN , DM       Vital Signs Last 24 Hrs  T(C): 37.9 (2018 13:28), Max: 37.9 (2018 13:28)  T(F): 100.3 (2018 13:28), Max: 100.3 (2018 13:28)  HR: 118 (2018 13:28) (118 - 118)  BP: 133/80 (2018 13:28) (133/80 - 133/80)  BP(mean): --  RR: 16 (2018 13:28) (16 - 16)  SpO2: 99% (2018 13:28) (99% - 99%)    PHYSICAL EXAM:      Constitutional: alert and oriented x 3    Breasts: no tenderness, no nodules    Respiratory: clear    Cardiovascular: regular rate and rhythm    Gastrointestinal: soft, appropriately tender at incision (C/D/I), nontender uterine fundus, no rebound or guarding     Genitourinary: NEFG  Cervix: closed/ long, no CMT  Uterus: ~12-14 wk size, non tender  Adnexa: non tender, no palpable masses    Extremities: no calf tendernes, fabian's sign neg         LABS:                        10.6   8.41  )-----------( 205      ( 2018 14:16 )             32.5     -    139  |  103  |  14  ----------------------------<  89  4.1   |  20<L>  |  0.83    Ca    8.8      2018 14:16    TPro  7.0  /  Alb  3.1<L>  /  TBili  < 0.2<L>  /  DBili  x   /  AST  22  /  ALT  16  /  AlkPhos  84  11-01      Urinalysis Basic - ( 2018 15:50 )    Color: LIGHT YELLOW / Appearance: Lt TURBID / S.011 / pH: 6.5  Gluc: NEGATIVE / Ketone: NEGATIVE  / Bili: NEGATIVE / Urobili: NORMAL   Blood: MODERATE / Protein: 10 / Nitrite: NEGATIVE   Leuk Esterase: MODERATE / RBC: 0-2 / WBC 11-25   Sq Epi: MODERATE / Non Sq Epi: x / Bacteria: NEGATIVE        RADIOLOGY & ADDITIONAL STUDIES: GYN Consult Note    23y  s/p C/S on 10/25 presents with body aches, chills and fever to 103.8F at home. Patient reports being discharged on  and had chills at that time.  through Wednesday she was taking tylenol for incisional discomfort and then due to chills/fever she began taking motrin too. She reports taking temperature and found it to be 103.8. She reports mild cough that is nonproductive and abdominal pain during coughing but otherwise denies abdominal pain/pelvic pain. Denies any CP/SOB, N/V, diarrhea, constipation, abnormal vaginal discharge/odors, calf tenderness. Bleeding has been minimal and reports now minimal amount of brown spotting. Is ambulating at home. Is breastfeeding every 3 hours without difficulty. Denies any associated breast tenderness.     OB/GYN HISTORY:  - s/p TOP x4 w/ D&C x2. C/S 10/25 for arrest at 8.5cm w/ chorioamnionitis. Denies any uterine fibroids, ovarian cysts, abnormal pap smears.     Name of GYN Physician: Dr. Reyes  History of Abnormal Pap: denies        PAST MEDICAL & SURGICAL HISTORY:  No pertinent past medical history  H/O  section      REVIEW OF SYSTEMS  +cyclic chills, body aches, cough   Denies any CP/SOB, N/V, diarrhea, constipation, abnormal vaginal discharge/odors, calf tenderness.       MEDICATIONS  (STANDING):  clindamycin IVPB 900 milliGRAM(s) IV Intermittent Once    MEDICATIONS  (PRN):      Allergies    No Known Drug Allergies  Seafood (Hives)    Intolerances        SOCIAL HISTORY: denies any alcohol, drugs or tobacco	    FAMILY HISTORY: HTN , DM       Vital Signs Last 24 Hrs  T(C): 37.9 (2018 13:28), Max: 37.9 (2018 13:28)  T(F): 100.3 (2018 13:28), Max: 100.3 (2018 13:28)  HR: 118 (2018 13:28) (118 - 118)  BP: 133/80 (2018 13:28) (133/80 - 133/80)  BP(mean): --  RR: 16 (2018 13:28) (16 - 16)  SpO2: 99% (2018 13:28) (99% - 99%)    PHYSICAL EXAM:    Constitutional: alert and oriented x 3    Breasts: no tenderness, no nodules    Respiratory: clear    Cardiovascular: regular rate and rhythm    Gastrointestinal: soft, appropriately tender at incision (C/D/I), nontender uterine fundus, no rebound or guarding     Genitourinary: NEFG  Cervix: closed/ long, no CMT  Uterus: ~12-14 wk size, non tender  Adnexa: non tender, no palpable masses    Extremities: no calf tendernes, fabian's sign neg       LABS:                        10.6   8.41  )-----------( 205      ( 2018 14:16 )             32.5         139  |  103  |  14  ----------------------------<  89  4.1   |  20<L>  |  0.83    Ca    8.8      2018 14:16    TPro  7.0  /  Alb  3.1<L>  /  TBili  < 0.2<L>  /  DBili  x   /  AST  22  /  ALT  16  /  AlkPhos  84        Urinalysis Basic - ( 2018 15:50 )    Color: LIGHT YELLOW / Appearance: Lt TURBID / S.011 / pH: 6.5  Gluc: NEGATIVE / Ketone: NEGATIVE  / Bili: NEGATIVE / Urobili: NORMAL   Blood: MODERATE / Protein: 10 / Nitrite: NEGATIVE   Leuk Esterase: MODERATE / RBC: 0-2 / WBC 11-25   Sq Epi: MODERATE / Non Sq Epi: x / Bacteria: NEGATIVE        RADIOLOGY & ADDITIONAL STUDIES:    < from: US Pelvis Complete (18 @ 15:42) >    EXAM:  US PELVIC COMPLETE        PROCEDURE DATE:  2018     INTERPRETATION:  CLINICAL INFORMATION: Fever and pain status post   .    COMPARISON: None available.    TECHNIQUE:     Transabdominal pelvic sonogram only.        FINDINGS:    Uterus: Post gravid uterus measuring 13 x 8 x 11 cm.     Endometrium: 19 mm. Heterogeneously thickened without vascularity.    Right ovary: 3 x 2.4 x 2.5 cm. Within normal limits.    Left ovary: 3.3 x 1.7 x 2.9 cm. Within normal limits.    Fluid: None.    IMPRESSION:    Heterogeneously thickened endometrium without vascularity.    YESIKA LEACH M.D., ATTENDING RADIOLOGIST  This document has been electronically signed. 2018  3:54PM        < end of copied text >

## 2018-11-01 NOTE — H&P ADULT - PROBLEM SELECTOR PLAN 1
- patient presenting postpartum with fevers and tachycardia meeting criteria for SIRS, unknown etiology of infection at this time  - ddx includes endometriosis vs lung infection (given cough) vs other intra-abdominal infection vs DVT/PE vs viral infection vs UTI (+LE and WBC, but contaminated specimen and no bacteria present)  - US of pelvis does not show an intra-uterine abnormality and OB does not believes fevers to be of gyn origin  - given post partum fevers- endometritis still on differential, however need to rule out other causes on infection  - follow up RVP and blood/urine cultures  - Wells score=3 ( recent surgery and tachycardia)- moderate risk of PE; however with PE, generally low grade temperature and patient presenting with T of > 102 more concerning for septic picture  - if RVP neg and blood and urine cultures negative with persistent fevers- may require further imaging  - c/w zosyn for now per ID recommendations (f/u official consult in AM) - patient presenting postpartum with fevers and tachycardia meeting criteria for SIRS, unknown etiology of infection at this time  - ddx includes endometriosis vs lung infection (given cough) vs other intra-abdominal infection vs DVT/PE vs viral infection vs UTI (+LE and WBC, but contaminated specimen and no bacteria present)  - US of pelvis does not show an intra-uterine abnormality and OB does not believes fevers to be of gyn origin  - given post partum fevers- endometritis still on differential, however need to rule out other causes on infection  - follow up RVP and blood/urine cultures  - Wells score=3 ( recent surgery and tachycardia)- moderate risk of PE and history of multiple miscarriages (? hypercoagulable),  however with PE, generally low grade temperature and patient presenting with T of > 102 more concerning for septic picture  - if RVP neg and blood and urine cultures negative with persistent fevers- may require further imaging  - c/w zosyn for now per ID recommendations (f/u official consult in AM)

## 2018-11-01 NOTE — ED PROVIDER NOTE - OBJECTIVE STATEMENT
24 yo F with no sig PMHx presents to the ED with 22 yo F with no sig PMHx Pt is s/p  1 week ago, reports fever while in hospital but then was discharged after being afebrile presents to the ED with body aches, fatigue, chills and fever, began yesterday, Pt endorses tmax 103.8 this am around 1030am, at that time pt took 2 Tylenol tabs and 2 Motrin tabs. Pt states she had been feeling well, until yesterday. Pt reports dysuria and mild phlegm producing cough x days. Denies cp, diarrhea, change in vaginal bleeding, change in pain level, vaginal discharge, dizziness, weakness, nausea, vomiting.

## 2018-11-01 NOTE — H&P ADULT - NSHPLABSRESULTS_GEN_ALL_CORE
10.6   8.41  )-----------( 205      ( 2018 14:16 )             32.5           139  |  103  |  14  ----------------------------<  89  4.1   |  20<L>  |  0.83    Ca    8.8      2018 14:16    TPro  7.0  /  Alb  3.1<L>  /  TBili  < 0.2<L>  /  DBili  x   /  AST  22  /  ALT  16  /  AlkPhos  84                  14:16 - VBG - pH: 7.43  | pCO2: 36    | pO2: 58    | Lactate: 1.2        Urinalysis Basic - ( 2018 15:50 )    Color: LIGHT YELLOW / Appearance: Lt TURBID / S.011 / pH: 6.5  Gluc: NEGATIVE / Ketone: NEGATIVE  / Bili: NEGATIVE / Urobili: NORMAL   Blood: MODERATE / Protein: 10 / Nitrite: NEGATIVE   Leuk Esterase: MODERATE / RBC: 0-2 / WBC 11-25   Sq Epi: MODERATE / Non Sq Epi: x / Bacteria: NEGATIVE        EKG:  pending  CXR:  clear

## 2018-11-01 NOTE — H&P ADULT - NSHPPHYSICALEXAM_GEN_ALL_CORE
T(C): 38.2 (2018 19:30), Max: 39.4 (2018 17:57)  T(F): 100.7 (2018 19:30), Max: 102.9 (2018 17:57)  HR: 82 (2018 19:30) (78 - 118)  BP: 132/72 (2018 19:30) (132/72 - 146/97)  BP(mean): --  ABP: --  ABP(mean): --  RR: 16 (2018 19:30) (16 - 20)  SpO2: 100% (2018 19:30) (99% - 100%)      General: ill appearing female, NAD, laying in bed  Neurology: A&Ox3, non focal exam  Head:  Normocephalic, atraumatic  ENT:  Mucosa moist, no ulcerations  Neck:  Supple, no sinuses or palpable masses  Lymphatic:  No palpable cervical, supraclavicular, axillary or inguinal adenopathy  Respiratory: clear to auscultation b/l, no wheezes or rhonchi  CV: tachycardic, regular rhythm, no murmurs/rubs  Abdominal: Soft, NT, ND ,   scar healing well, uterus palpable midline between umbilicus and pubic symphysis- non tender; no CVA tenderness  MSK: No edema, + peripheral pulses, no spinal tenderness

## 2018-11-01 NOTE — CONSULT NOTE ADULT - PROBLEM SELECTOR RECOMMENDATION 9
- TVUS with no concerning findings  - Pelvic exam/bimanual exam without sign of endometritis, no uterine tenderness   - Breast exam with no erythema or tenderness   - CXR pending    Saw pt with Dr. Reyes, recommending ID input regarding cyclical chills without clear source. Will continue to monitor and pending workup, will provide further recommendation    MARLON Tejada, PGY-2

## 2018-11-01 NOTE — ED ADULT NURSE NOTE - NSIMPLEMENTINTERV_GEN_ALL_ED
Implemented All Universal Safety Interventions:  [place] to call system. Call bell, personal items and telephone within reach. Instruct patient to call for assistance. Room bathroom lighting operational. Non-slip footwear when patient is off stretcher. Physically safe environment: no spills, clutter or unnecessary equipment. Stretcher in lowest position, wheels locked, appropriate side rails in place.

## 2018-11-02 ENCOUNTER — APPOINTMENT (OUTPATIENT)
Dept: OBGYN | Facility: CLINIC | Age: 23
End: 2018-11-02

## 2018-11-02 ENCOUNTER — TRANSCRIPTION ENCOUNTER (OUTPATIENT)
Age: 23
End: 2018-11-02

## 2018-11-02 LAB
ALBUMIN SERPL ELPH-MCNC: 2.7 G/DL — LOW (ref 3.3–5)
ALP SERPL-CCNC: 70 U/L — SIGNIFICANT CHANGE UP (ref 40–120)
ALT FLD-CCNC: 17 U/L — SIGNIFICANT CHANGE UP (ref 4–33)
AST SERPL-CCNC: 17 U/L — SIGNIFICANT CHANGE UP (ref 4–32)
BILIRUB SERPL-MCNC: 0.2 MG/DL — SIGNIFICANT CHANGE UP (ref 0.2–1.2)
BUN SERPL-MCNC: 10 MG/DL — SIGNIFICANT CHANGE UP (ref 7–23)
CALCIUM SERPL-MCNC: 8.7 MG/DL — SIGNIFICANT CHANGE UP (ref 8.4–10.5)
CHLORIDE SERPL-SCNC: 106 MMOL/L — SIGNIFICANT CHANGE UP (ref 98–107)
CO2 SERPL-SCNC: 22 MMOL/L — SIGNIFICANT CHANGE UP (ref 22–31)
CREAT SERPL-MCNC: 0.76 MG/DL — SIGNIFICANT CHANGE UP (ref 0.5–1.3)
GLUCOSE SERPL-MCNC: 94 MG/DL — SIGNIFICANT CHANGE UP (ref 70–99)
HCT VFR BLD CALC: 30.6 % — LOW (ref 34.5–45)
HGB BLD-MCNC: 10 G/DL — LOW (ref 11.5–15.5)
MAGNESIUM SERPL-MCNC: 1.9 MG/DL — SIGNIFICANT CHANGE UP (ref 1.6–2.6)
MCHC RBC-ENTMCNC: 29.1 PG — SIGNIFICANT CHANGE UP (ref 27–34)
MCHC RBC-ENTMCNC: 32.7 % — SIGNIFICANT CHANGE UP (ref 32–36)
MCV RBC AUTO: 89 FL — SIGNIFICANT CHANGE UP (ref 80–100)
NRBC # FLD: 0 — SIGNIFICANT CHANGE UP
PHOSPHATE SERPL-MCNC: 3.4 MG/DL — SIGNIFICANT CHANGE UP (ref 2.5–4.5)
PLATELET # BLD AUTO: 186 K/UL — SIGNIFICANT CHANGE UP (ref 150–400)
PMV BLD: 8.9 FL — SIGNIFICANT CHANGE UP (ref 7–13)
POTASSIUM SERPL-MCNC: 3.8 MMOL/L — SIGNIFICANT CHANGE UP (ref 3.5–5.3)
POTASSIUM SERPL-SCNC: 3.8 MMOL/L — SIGNIFICANT CHANGE UP (ref 3.5–5.3)
PROT SERPL-MCNC: 6.7 G/DL — SIGNIFICANT CHANGE UP (ref 6–8.3)
RBC # BLD: 3.44 M/UL — LOW (ref 3.8–5.2)
RBC # FLD: 14.9 % — HIGH (ref 10.3–14.5)
SODIUM SERPL-SCNC: 138 MMOL/L — SIGNIFICANT CHANGE UP (ref 135–145)
SPECIMEN SOURCE: SIGNIFICANT CHANGE UP
SPECIMEN SOURCE: SIGNIFICANT CHANGE UP
WBC # BLD: 9.09 K/UL — SIGNIFICANT CHANGE UP (ref 3.8–10.5)
WBC # FLD AUTO: 9.09 K/UL — SIGNIFICANT CHANGE UP (ref 3.8–10.5)

## 2018-11-02 PROCEDURE — 99223 1ST HOSP IP/OBS HIGH 75: CPT

## 2018-11-02 PROCEDURE — 99233 SBSQ HOSP IP/OBS HIGH 50: CPT | Mod: GC

## 2018-11-02 RX ORDER — ACETAMINOPHEN 500 MG
650 TABLET ORAL EVERY 6 HOURS
Qty: 0 | Refills: 0 | Status: DISCONTINUED | OUTPATIENT
Start: 2018-11-02 | End: 2018-11-02

## 2018-11-02 RX ORDER — ACETAMINOPHEN 500 MG
650 TABLET ORAL ONCE
Qty: 0 | Refills: 0 | Status: COMPLETED | OUTPATIENT
Start: 2018-11-02 | End: 2018-11-02

## 2018-11-02 RX ORDER — FOLIC ACID 0.8 MG
1 TABLET ORAL DAILY
Qty: 0 | Refills: 0 | Status: DISCONTINUED | OUTPATIENT
Start: 2018-11-02 | End: 2018-11-05

## 2018-11-02 RX ORDER — ACETAMINOPHEN 500 MG
975 TABLET ORAL EVERY 6 HOURS
Qty: 0 | Refills: 0 | Status: DISCONTINUED | OUTPATIENT
Start: 2018-11-02 | End: 2018-11-05

## 2018-11-02 RX ORDER — IBUPROFEN 200 MG
600 TABLET ORAL EVERY 6 HOURS
Qty: 0 | Refills: 0 | Status: DISCONTINUED | OUTPATIENT
Start: 2018-11-02 | End: 2018-11-05

## 2018-11-02 RX ADMIN — PIPERACILLIN AND TAZOBACTAM 25 GRAM(S): 4; .5 INJECTION, POWDER, LYOPHILIZED, FOR SOLUTION INTRAVENOUS at 02:34

## 2018-11-02 RX ADMIN — PIPERACILLIN AND TAZOBACTAM 25 GRAM(S): 4; .5 INJECTION, POWDER, LYOPHILIZED, FOR SOLUTION INTRAVENOUS at 12:14

## 2018-11-02 RX ADMIN — Medication 650 MILLIGRAM(S): at 00:40

## 2018-11-02 RX ADMIN — Medication 650 MILLIGRAM(S): at 01:10

## 2018-11-02 RX ADMIN — PIPERACILLIN AND TAZOBACTAM 25 GRAM(S): 4; .5 INJECTION, POWDER, LYOPHILIZED, FOR SOLUTION INTRAVENOUS at 19:13

## 2018-11-02 RX ADMIN — ENOXAPARIN SODIUM 40 MILLIGRAM(S): 100 INJECTION SUBCUTANEOUS at 12:15

## 2018-11-02 RX ADMIN — Medication 1 MILLIGRAM(S): at 19:13

## 2018-11-02 RX ADMIN — Medication 650 MILLIGRAM(S): at 15:59

## 2018-11-02 RX ADMIN — Medication 650 MILLIGRAM(S): at 15:16

## 2018-11-02 NOTE — CONSULT NOTE ADULT - ASSESSMENT
23F with fever, dysuria, dry cough after  10/25/18.  [place] catheter was in place 10/25 --> 10/27    Antibiotics  Amp/Gent 10/25  Ertapenem 10/26  Clinida   Zosyn -->    Likely cause of fever is UTI. Other consideration include endometritis, deispite normal pelvic sono, ovarian vein thrombophlebitis and viral syndrome -despite neg RVP. Zosyn is good empiric coverage pending cultures.    Suggest:  Continue Zosyn -->  monitor culture results  if fevers persist consider pelvic CT scan, MRV of ovarian veins.    Patient will be followed by ID over weekend.  Discussed with resident
23y  s/p C/S on 10/25 presents with body aches, chills and fever to 103.8F at home, afebrile here, with unclear source of fevers, in stable condition.

## 2018-11-02 NOTE — DISCHARGE NOTE ADULT - PLAN OF CARE
Resolved You were admitted to the hospital for a high fever. You were started on IV antibiotics and underwent a work up to find the source of your fevers. Ongoing management You were admitted to the hospital for a high fever of 103 degrees Farenheit. You were started on IV antibiotics and underwent a work up to find the source of your fevers. Your urinalysis was positive with symptoms of pain with urination and increased frequency in urination, indicating a urinary tract infection. Infectious Disease was consulted who recommended a renal ultrasound to rule out hydronephrosis, as you had very high fevers. The ultrasound was normal. Your urine culture You are currently one week post-partum. OBGYN was consulted who performed a transvaginal ultrasound. The study showed no concerning findings and you were monitored for any signs of infection fro your uterus and your  section incision site.

## 2018-11-02 NOTE — PROGRESS NOTE ADULT - SUBJECTIVE AND OBJECTIVE BOX
Pt reports mild HA and mild occas incisional pain. Resolution of chills.  Pumping breast milk manually    ICU Vital Signs Last 24 Hrs  T(C): 37.9 (02 Nov 2018 15:59), Max: 39.4 (01 Nov 2018 17:57)  T(F): 100.3 (02 Nov 2018 15:59), Max: 102.9 (01 Nov 2018 17:57)  HR: 78 (02 Nov 2018 15:59) (74 - 116)  BP: 140/99 (02 Nov 2018 15:05) (132/72 - 150/90)  BP(mean): --  ABP: --  ABP(mean): --  RR: 18 (02 Nov 2018 15:05) (16 - 20)  SpO2: 100% (02 Nov 2018 15:05) (98% - 100%)  Breast-supple, nontender, no erythema, lactating  abd-soft nondistended, nontender fundus,   Inc--clean and intact, no erythema  Ext NT no edema; IV site--nontender no erythema    HD#2 s/p C/S on 10/25 readmitted  with high fevers, no focal source of infection  Medicine management appreciated  Will transfer to OB service  continue Invanz  will consider Abd/pelvis CT especially if fever curve does not continue to drop  Ucx and Bcx neg to date

## 2018-11-02 NOTE — DISCHARGE NOTE ADULT - CARE PROVIDER_API CALL
Rocio Guy (MD), Obstetrics and Gynecology  [facility] [place] [address]  Phone: [phone]  Fax: [phone]

## 2018-11-02 NOTE — PATIENT PROFILE ADULT - VISION (WITH CORRECTIVE LENSES IF THE PATIENT USUALLY WEARS THEM):
Partially impaired: cannot see medication labels or newsprint, but can see obstacles in path, and the surrounding layout; can count fingers at arm's length/Pt left glasses at home

## 2018-11-02 NOTE — DISCHARGE NOTE ADULT - CARE PLAN
Principal Discharge DX:	Fever  Goal:	Resolved  Assessment and plan of treatment:	You were admitted to the hospital for a high fever. You were started on IV antibiotics and underwent a work up to find the source of your fevers.  Secondary Diagnosis:	Postpartum care and examination  Goal:	Ongoing management Principal Discharge DX:	Fever  Goal:	Resolved  Assessment and plan of treatment:	You were admitted to the hospital for a high fever of 103 degrees Farenheit. You were started on IV antibiotics and underwent a work up to find the source of your fevers. Your urinalysis was positive with symptoms of pain with urination and increased frequency in urination, indicating a urinary tract infection. Infectious Disease was consulted who recommended a renal ultrasound to rule out hydronephrosis, as you had very high fevers. The ultrasound was normal. Your urine culture  Secondary Diagnosis:	Postpartum care and examination  Goal:	Ongoing management  Assessment and plan of treatment:	You are currently one week post-partum. OBGYN was consulted who performed a transvaginal ultrasound. The study showed no concerning findings and you were monitored for any signs of infection fro your uterus and your  section incision site.

## 2018-11-02 NOTE — PROGRESS NOTE ADULT - PROBLEM SELECTOR PLAN 1
- patient presenting postpartum with fevers and tachycardia meeting criteria for SIRS, unknown etiology of infection at this time  - ddx includes endometriosis vs lung infection (given cough) vs other intra-abdominal infection vs DVT/PE vs viral infection vs UTI (+LE and WBC, but contaminated specimen and no bacteria present)  - US of pelvis does not show an intra-uterine abnormality and OB does not believes fevers to be of gyn origin  - given post partum fevers- endometritis still on differential, however need to rule out other causes on infection  - follow up RVP and blood/urine cultures  - Wells score=3 ( recent surgery and tachycardia)- moderate risk of PE and history of multiple miscarriages (? hypercoagulable),  however with PE, generally low grade temperature and patient presenting with T of > 102 more concerning for septic picture  - if RVP neg and blood and urine cultures negative with persistent fevers- may require further imaging  - c/w zosyn for now per ID recommendations (f/u official consult in AM) - patient presenting postpartum with fevers and tachycardia meeting criteria for SIRS, unknown etiology of infection at this time  - ddx includes endometriosis vs lung infection (given cough) vs other intra-abdominal infection vs DVT/PE vs viral infection vs UTI (+LE and WBC, but contaminated specimen and no bacteria present)  - US of pelvis does not show an intra-uterine abnormality and OB does not believes fevers to be of gyn origin  - given post partum fevers- endometritis still on differential, however need to rule out other causes on infection  -RVP negative, BCX NGTD  - Wells score=3 ( recent surgery and tachycardia)- moderate risk of PE and history of multiple miscarriages (? hypercoagulable),  however with PE, generally low grade temperature and patient presenting with T of > 102 more concerning for septic picture  - c/w zosyn   -ID consulted; appreciate recs - patient presenting postpartum with fevers and tachycardia meeting criteria for SIRS, unknown etiology of infection at this time  - ddx includes endometriosis vs lung infection (given cough) vs other intra-abdominal infection vs DVT/PE vs viral infection vs UTI (+LE and WBC, but contaminated specimen and no bacteria present)  - US of pelvis does not show an intra-uterine abnormality and OB does not believes fevers to be of gyn origin  - given post partum fevers- endometritis still on differential, however need to rule out other causes on infection  -RVP negative, BCX NGTD  -UA positive for moderate leuk esterase, WBC 11-25, moderate blood  - Wells score=3 ( recent surgery and tachycardia)- moderate risk of PE and history of multiple miscarriages (? hypercoagulable),  however with PE, generally low grade temperature and patient presenting with T of > 102 more concerning for septic picture  - c/w zosyn   -ID consulted; appreciate recs - patient presenting postpartum with fevers and tachycardia meeting criteria for SIRS, unknown etiology of infection at this time  - ddx includes endometriosis vs lung infection (given cough) vs other intra-abdominal infection vs DVT/PE vs viral infection vs UTI (+LE and WBC, but contaminated specimen and no bacteria present)  - US of pelvis does not show an intra-uterine abnormality and OB does not believes fevers to be of gyn origin  - given post partum fevers- endometritis still on differential, however need to rule out other causes on infection  -RVP negative, BCX and UCX sent  -UA positive for moderate leuk esterase, WBC 11-25, moderate blood  - Wells score=3 ( recent surgery and tachycardia)- moderate risk of PE and history of multiple miscarriages (? hypercoagulable),  however with PE, generally low grade temperature and patient presenting with T of > 102 more concerning for septic picture  - c/w zosyn   -ID consulted; appreciate recs

## 2018-11-02 NOTE — DISCHARGE NOTE ADULT - OTHER SIGNIFICANT FINDINGS
< from: US Pelvis Complete (11.01.18 @ 15:42) >    IMPRESSION:    Heterogeneously thickened endometrium without vascularity.    < end of copied text >    < from: Xray Chest 2 Views PA/Lat (11.01.18 @ 18:53) >  IMPRESSION:     Clear lungs.    < end of copied text >
(2) assistive person

## 2018-11-02 NOTE — DISCHARGE NOTE ADULT - PATIENT PORTAL LINK FT
You can access the [email or web address], offered by [facility], by registering with the following website: [email or web address]

## 2018-11-02 NOTE — PROGRESS NOTE ADULT - SUBJECTIVE AND OBJECTIVE BOX
CHIEF COMPLAINT: fevers    Interval Events:    REVIEW OF SYSTEMS:  Constitutional: [ ] negative [ ] fevers [ ] chills [ ] weight loss [ ] weight gain  HEENT: [ ] negative [ ] dry eyes [ ] eye irritation [ ] postnasal drip [ ] nasal congestion  CV: [ ] negative  [ ] chest pain [ ] orthopnea [ ] palpitations [ ] murmur  Resp: [ ] negative [ ] cough [ ] shortness of breath [ ] dyspnea [ ] wheezing [ ] sputum [ ] hemoptysis  GI: [ ] negative [ ] nausea [ ] vomiting [ ] diarrhea [ ] constipation [ ] abd pain [ ] dysphagia   : [ ] negative [ ] dysuria [ ] nocturia [ ] hematuria [ ] increased urinary frequency  Musculoskeletal: [ ] negative [ ] back pain [ ] myalgias [ ] arthralgias [ ] fracture  Skin: [ ] negative [ ] rash [ ] itch  Neurological: [ ] negative [ ] headache [ ] dizziness [ ] syncope [ ] weakness [ ] numbness  Psychiatric: [ ] negative [ ] anxiety [ ] depression  Endocrine: [ ] negative [ ] diabetes [ ] thyroid problem  Hematologic/Lymphatic: [ ] negative [ ] anemia [ ] bleeding problem  Allergic/Immunologic: [ ] negative [ ] itchy eyes [ ] nasal discharge [ ] hives [ ] angioedema  [ ] All other systems negative  [ ] Unable to assess ROS because ________    OBJECTIVE:  T(C): 37.2 (2018 05:28), Max: 39.4 (2018 17:57)  T(F): 99 (2018 05:28), Max: 102.9 (2018 17:57)  HR: 74 (2018 05:28) (74 - 118)  BP: 150/90 (2018 05:28) (132/72 - 150/90)  RR: 18 (2018 05:28) (16 - 20)  SpO2: 98% (2018 05:28) (98% - 100%)        CAPILLARY BLOOD GLUCOSE          PHYSICAL EXAM:  General:   HEENT:   Lymph Nodes:  Neck:   Respiratory:   Cardiovascular:   Abdomen:   Extremities:   Skin:   Neurological:  Psychiatry:    LINES:    HOSPITAL MEDICATIONS:  enoxaparin Injectable 40 milliGRAM(s) SubCutaneous daily    piperacillin/tazobactam IVPB. 3.375 Gram(s) IV Intermittent every 8 hours          acetaminophen   Tablet .. 650 milliGRAM(s) Oral every 6 hours PRN                    LABS:                        10.0   9.09  )-----------( 186      ( 2018 06:00 )             30.6     Hgb Trend: 10.0<--, 10.6<--  11-02    138  |  106  |  10  ----------------------------<  94  3.8   |  22  |  0.76    Ca    8.7      2018 06:00  Phos  3.4       Mg     1.9         TPro  6.7  /  Alb  2.7<L>  /  TBili  0.2  /  DBili  x   /  AST  17  /  ALT  17  /  AlkPhos  70      Creatinine Trend: 0.76<--, 0.83<--, 0.87<--    Urinalysis Basic - ( 2018 15:50 )    Color: LIGHT YELLOW / Appearance: Lt TURBID / S.011 / pH: 6.5  Gluc: NEGATIVE / Ketone: NEGATIVE  / Bili: NEGATIVE / Urobili: NORMAL   Blood: MODERATE / Protein: 10 / Nitrite: NEGATIVE   Leuk Esterase: MODERATE / RBC: 0-2 / WBC 11-25   Sq Epi: MODERATE / Non Sq Epi: x / Bacteria: NEGATIVE        Venous Blood Gas:   @ 14:16  7.43/36/58/24/87.9  VBG Lactate: 1.2      MICROBIOLOGY:     RADIOLOGY:  [ ] Reviewed and interpreted by me    EKG: CHIEF COMPLAINT: fevers    Interval Events:  No acute events overnight. Patient was seen and examined at bedside. She denies CP, SOB, abdominal pain, or calf pain. She endorses cough that had started during her hospitalization for her . It is unchanged since then and it is associated w/ clear sputum production, mild back pain, and sore throat. Patient endorses vaginal spotting that is reducing in quantity.     REVIEW OF SYSTEMS:  Constitutional: [x] negative [ ] fevers [ ] chills [ ] weight loss [ ] weight gain  HEENT: [x] negative [ ] dry eyes [ ] eye irritation [ ] postnasal drip [ ] nasal congestion  CV: [x] negative  [ ] chest pain [ ] orthopnea [ ] palpitations [ ] murmur  Resp: [x] negative [ ] cough [ ] shortness of breath [ ] dyspnea [ ] wheezing [ ] sputum [ ] hemoptysis  GI: [x] negative [ ] nausea [ ] vomiting [ ] diarrhea [ ] constipation [ ] abd pain [ ] dysphagia   : [x] negative [ ] dysuria [ ] nocturia [ ] hematuria [ ] increased urinary frequency  Musculoskeletal: [x] negative [ ] back pain [ ] myalgias [ ] arthralgias [ ] fracture  Skin: [x] negative [ ] rash [ ] itch  Neurological: [x] negative [ ] headache [ ] dizziness [ ] syncope [ ] weakness [ ] numbness  Psychiatric: [x] negative [ ] anxiety [ ] depression  Endocrine: [x] negative [ ] diabetes [ ] thyroid problem  Hematologic/Lymphatic: [x] negative [ ] anemia [ ] bleeding problem  Allergic/Immunologic: [x] negative [ ] itchy eyes [ ] nasal discharge [ ] hives [ ] angioedema  [ ] All other systems negative  [ ] Unable to assess ROS because ________    OBJECTIVE:  T(C): 37.2 (2018 05:28), Max: 39.4 (2018 17:57)  T(F): 99 (2018 05:28), Max: 102.9 (2018 17:57)  HR: 74 (2018 05:28) (74 - 118)  BP: 150/90 (2018 05:28) (132/72 - 150/90)  RR: 18 (2018 05:28) (16 - 20)  SpO2: 98% (2018 05:28) (98% - 100%)        CAPILLARY BLOOD GLUCOSE          PHYSICAL EXAM:  GENERAL: NAD, well-developed  HEAD:  Atraumatic, Normocephalic  EYES: EOMI, PERRLA, conjunctiva and sclera clear  NECK: Supple, No JVD  CHEST/LUNGS:  clear to auscultation bilaterally; No wheeze  HEART: S1 and S2, regular rate and rhythm; No murmurs, rubs, or gallops  ABDOMEN: Soft, Nontender, Nondistended; Bowel sounds present;  incision present w/ steri strips, non erythematous, no swelling, non tender to palpation  EXTREMITIES:  2+ Peripheral Pulses, No clubbing, cyanosis, or edema, no tenderness to palpation of lower extremities  PSYCH: AAOx3, CN grossly intact  NEUROLOGY: non-focal  SKIN: No rashes or lesions    LINES:    HOSPITAL MEDICATIONS:  enoxaparin Injectable 40 milliGRAM(s) SubCutaneous daily    piperacillin/tazobactam IVPB. 3.375 Gram(s) IV Intermittent every 8 hours          acetaminophen   Tablet .. 650 milliGRAM(s) Oral every 6 hours PRN                    LABS:                        10.0   9.09  )-----------( 186      ( 2018 06:00 )             30.6     Hgb Trend: 10.0<--, 10.6<--  11-02    138  |  106  |  10  ----------------------------<  94  3.8   |  22  |  0.76    Ca    8.7      2018 06:00  Phos  3.4     11-  Mg     1.9     02    TPro  6.7  /  Alb  2.7<L>  /  TBili  0.2  /  DBili  x   /  AST  17  /  ALT  17  /  AlkPhos  70  11-02    Creatinine Trend: 0.76<--, 0.83<--, 0.87<--    Urinalysis Basic - ( 2018 15:50 )    Color: LIGHT YELLOW / Appearance: Lt TURBID / S.011 / pH: 6.5  Gluc: NEGATIVE / Ketone: NEGATIVE  / Bili: NEGATIVE / Urobili: NORMAL   Blood: MODERATE / Protein: 10 / Nitrite: NEGATIVE   Leuk Esterase: MODERATE / RBC: 0-2 / WBC 11-25   Sq Epi: MODERATE / Non Sq Epi: x / Bacteria: NEGATIVE        Venous Blood Gas:   @ 14:16  7.43/36/58/24/87.9  VBG Lactate: 1.2      MICROBIOLOGY:     RADIOLOGY:  [ ] Reviewed and interpreted by me    EKG: CHIEF COMPLAINT: fevers    Interval Events:  No acute events overnight. Patient was seen and examined at bedside. She denies CP, SOB, abdominal pain, or calf pain. She endorses cough that had started during her hospitalization for her . It is unchanged since then and it is associated w/ clear sputum production, mild back pain, and sore throat. Patient endorses vaginal spotting that is reducing in quantity. Patient also endorses dysuria, increased frequency in urination, and an appearance of cloudy urine that started two days ago.     REVIEW OF SYSTEMS:  Constitutional: [x] negative [ ] fevers [ ] chills [ ] weight loss [ ] weight gain  HEENT: [x] negative [ ] dry eyes [ ] eye irritation [ ] postnasal drip [ ] nasal congestion  CV: [x] negative  [ ] chest pain [ ] orthopnea [ ] palpitations [ ] murmur  Resp: [x] negative [ ] cough [ ] shortness of breath [ ] dyspnea [ ] wheezing [ ] sputum [ ] hemoptysis  GI: [x] negative [ ] nausea [ ] vomiting [ ] diarrhea [ ] constipation [ ] abd pain [ ] dysphagia   : [x] negative [ ] dysuria [ ] nocturia [ ] hematuria [ ] increased urinary frequency  Musculoskeletal: [x] negative [ ] back pain [ ] myalgias [ ] arthralgias [ ] fracture  Skin: [x] negative [ ] rash [ ] itch  Neurological: [x] negative [ ] headache [ ] dizziness [ ] syncope [ ] weakness [ ] numbness  Psychiatric: [x] negative [ ] anxiety [ ] depression  Endocrine: [x] negative [ ] diabetes [ ] thyroid problem  Hematologic/Lymphatic: [x] negative [ ] anemia [ ] bleeding problem  Allergic/Immunologic: [x] negative [ ] itchy eyes [ ] nasal discharge [ ] hives [ ] angioedema  [ ] All other systems negative  [ ] Unable to assess ROS because ________    OBJECTIVE:  T(C): 37.2 (2018 05:28), Max: 39.4 (2018 17:57)  T(F): 99 (2018 05:28), Max: 102.9 (2018 17:57)  HR: 74 (2018 05:28) (74 - 118)  BP: 150/90 (2018 05:28) (132/72 - 150/90)  RR: 18 (2018 05:28) (16 - 20)  SpO2: 98% (2018 05:28) (98% - 100%)        CAPILLARY BLOOD GLUCOSE          PHYSICAL EXAM:  GENERAL: NAD, well-developed  HEAD:  Atraumatic, Normocephalic  EYES: EOMI, PERRLA, conjunctiva and sclera clear  NECK: Supple, No JVD  CHEST/LUNGS:  clear to auscultation bilaterally; No wheeze  HEART: S1 and S2, regular rate and rhythm; No murmurs, rubs, or gallops  ABDOMEN: Soft, Nontender, Nondistended; Bowel sounds present;  incision present w/ steri strips, non erythematous, no swelling, non tender to palpation  EXTREMITIES:  2+ Peripheral Pulses, No clubbing, cyanosis, or edema, no tenderness to palpation of lower extremities  PSYCH: AAOx3, CN grossly intact  NEUROLOGY: non-focal  SKIN: No rashes or lesions    LINES:    HOSPITAL MEDICATIONS:  enoxaparin Injectable 40 milliGRAM(s) SubCutaneous daily    piperacillin/tazobactam IVPB. 3.375 Gram(s) IV Intermittent every 8 hours          acetaminophen   Tablet .. 650 milliGRAM(s) Oral every 6 hours PRN                    LABS:                        10.0   9.09  )-----------( 186      ( 2018 06:00 )             30.6     Hgb Trend: 10.0<--, 10.6<--  11-02    138  |  106  |  10  ----------------------------<  94  3.8   |  22  |  0.76    Ca    8.7      2018 06:00  Phos  3.4     11-02  Mg     1.9     11-02    TPro  6.7  /  Alb  2.7<L>  /  TBili  0.2  /  DBili  x   /  AST  17  /  ALT  17  /  AlkPhos  70  11-02    Creatinine Trend: 0.76<--, 0.83<--, 0.87<--    Urinalysis Basic - ( 2018 15:50 )    Color: LIGHT YELLOW / Appearance: Lt TURBID / S.011 / pH: 6.5  Gluc: NEGATIVE / Ketone: NEGATIVE  / Bili: NEGATIVE / Urobili: NORMAL   Blood: MODERATE / Protein: 10 / Nitrite: NEGATIVE   Leuk Esterase: MODERATE / RBC: 0-2 / WBC 11-25   Sq Epi: MODERATE / Non Sq Epi: x / Bacteria: NEGATIVE        Venous Blood Gas:   @ 14:16  7.43/36/58/24/87.9  VBG Lactate: 1.2      MICROBIOLOGY:     RADIOLOGY:  [ ] Reviewed and interpreted by me    EKG:

## 2018-11-02 NOTE — DISCHARGE NOTE ADULT - MEDICATION SUMMARY - MEDICATIONS TO TAKE
I will START or STAY ON the medications listed below when I get home from the hospital:    Motrin 600 mg oral tablet  -- 1 tab(s) by mouth every 6 hours, As Needed  -- Indication: For Pain    Tylenol 325 mg oral tablet  -- 2 tab(s) by mouth every 4 hours, As Needed  -- Indication: For Pain    Augmentin 875 mg-125 mg oral tablet  -- 875 milligram(s) by mouth 2 times a day   -- Finish all this medication unless otherwise directed by prescriber.  Take with food or milk.    -- Indication: For Antibiotic

## 2018-11-02 NOTE — CONSULT NOTE ADULT - SUBJECTIVE AND OBJECTIVE BOX
Patient is a 23y old  Female who presents with a chief complaint of fevers (2018 13:42)    HPI:  23F  with no past medical history, presents 1 week post  section with fevers for past 24 hours. Patient states that last night, she felt chills, and shortly after developed a fever. She took 2 motrins and 2 tylenols overnight. This morning, she again felt chills and had a fever, and this time she measured it reading 103.8, promoting her to come to the ED. She endorses she had fevers and chills prior to leaving the hospital, but on day of discharge was afebrile. Postpartum, patient states she had been having a productive cough with white sputum, that has remained largely unchanged. She endorses shortness of breath with the cough. She endorses some dysuria, denies hematuria, and states she has mild pain at the site of the surgical scar. She also endorses some back pain which started prior to delivery.    Patient states she had an uneventful prenancy.   Early in the pregnancy she had gingival problem. She does not recall treatment.  On 10/25/18 she had intra partum fever. She was given AMP/Gent after delievery. Copeland was placed on 10/25 and removed 10/26   On POD1, patient developed fevers and hypoxia to 93%, and was given ertapenem 1 gram x 1 dose. Chest sono demonstrated B lines. She had prompt clinical improvement.  Blood cultures and urine cultures were negative prior to discharge.   Patient states she has some minimal vaginal discharge at this time and is breast feeding the baby.   She notes burning with voiding and frequency, She has had UTIs previously. There is disomfort at incision site.   She had flu shot during pregnancy.  Her  had cold with rhinitis - He is at bedside. She notes headache, dry cough, sore throat which she attributes her coughing, sweats. She is understandably upset at being hospitalized and very anxious to return home and be with her baby    In the ED, patient's /80; ; T 100.3- Tmax 102.8, O2 sat 99% on room air. Patient given clinda and zosyn in the ED. (2018 21:47)    PAST MEDICAL & SURGICAL HISTORY:  No pertinent past medical history  H/O  section 10/25/18      Social history: , no tobacco, works in bank    FAMILY HISTORY:  Family history of hypertension (Father, Mother)  Family history of diabetes mellitus (Father, Mother)      REVIEW OF SYSTEMS:  CONSTITUTIONAL: No weakness, + fevers No chills  EYES/ENT: No visual changes;  No vertigo; + throat pain   NECK: No pain or stiffness  RESPIRATORY: Dry cough, No wheezing, hemoptysis; No shortness of breath, No pleuritic pain  CARDIOVASCULAR: No chest pain or palpitations  GASTROINTESTINAL: No abdominal or epigastric pain. No nausea, vomiting, or hematemesis; No diarrhea or constipation. No melena or hematochezia.  GENITOURINARY: + dysuria, frequency or hematuria  NEUROLOGICAL: No numbness or weakness  SKIN: No itching, burning, rashes, or lesions   All other review of systems is negative unless indicated above    Allergies  No Known Drug Allergies  Seafood (Hives)      Antimicrobials:  piperacillin/tazobactam IVPB. 3.375 Gram(s) IV Intermittent every 8 hours    Vital Signs Last 24 Hrs  T(C): 38.7 (2018 15:05), Max: 39.4 (2018 17:57)  T(F): 101.6 (2018 15:05), Max: 102.9 (2018 17:57)  HR: 74 (2018 05:28) (74 - 116)  BP: 140/99 (2018 15:05) (132/72 - 150/90)  BP(mean): --  RR: 18 (2018 15:05) (16 - 20)  SpO2: 100% (2018 15:05) (98% - 100%)    PHYSICAL EXAM:  General: WN/WD NAD, Non-toxic  Neurology: A&Ox3, nonfocal  Respiratory: Clear to auscultation bilaterally  CV: RRR, S1S2, no murmurs, rubs or gallops  Abdominal: Soft, Non-tender, non-distended, No CVAT  Extremities: No edema,   Line Sites: Clear  Skin: No rash, incision site intact -- steristrips                        10.0   9.09  )-----------( 186      ( 02    138  |  106  |  10  ----------------------------<  94  3.8   |  22  |  0.76    Ca    8.7      2018 06:00  Phos  3.4       Mg     1.9         TPro  6.7  /  Alb  2.7<L>  /  TBili  0.2  /  DBili  x   /  AST  17  /  ALT  17  /  AlkPhos  70      MICROBIOLOGY:  BLOOD VENOUS  10-26-18 --  --  --      URINE CATHETER  10-26-18 --  --  --    Radiology:  < from: Xray Chest 2 Views PA/Lat (18 @ 18:53) >  Clear lungs.    < end of copied text >  < from: US Pelvis Complete (18 @ 15:42) >  FINDINGS:    Uterus: Post gravid uterus measuring 13 x 8 x 11 cm.     Endometrium: 19 mm. Heterogeneously thickened without vascularity.    Right ovary: 3 x 2.4 x 2.5 cm. Within normal limits.    Left ovary: 3.3 x 1.7 x 2.9 cm. Within normal limits.    Fluid: None.    IMPRESSION:    Heterogeneously thickened endometrium without vascularity.    < end of copied text >      Khari Hanna MD; Division of Infectious Disease; Pager: [phone]; nights and weekends: [phone]

## 2018-11-02 NOTE — PROGRESS NOTE ADULT - PROBLEM SELECTOR PLAN 2
- patient postpartum 1 week  -  scar healing appropriately and lochia minimal at this time  - will contact OB to provide breast pump as patient breastfeeding her child  - unknown effects of zosyn on breast milk- will advise to discard milk - patient postpartum 1 week  -  scar healing appropriately and lochia minimal at this time  - Breast pump ordered  - unknown effects of zosyn on breast milk- will advise to discard milk

## 2018-11-02 NOTE — DISCHARGE NOTE ADULT - HOSPITAL COURSE
Hospital course per admitting resident:   23F  with no past medical history, presents 1 week post  section with fevers for past 24 hours. Patient states that last night, she felt chills, and shortly after developed a fever. She took 2 motrins and 2 tylenols overnight. This morning, she again felt chills and had a fever, and this time she measured it reading 103.8, promoting her to come to the ED. She endorses she had fevers and chills prior to leaving the hospital, but on day of discharge was afebrile. Postpartum, patient states she had been having a productive cough with white sputum, that has remained largely unchanged. She endorses shortness of breath with the cough. She endorses some dysuria, denies hematuria, and states she has mild pain at the site of the surgical scar. She also endorses some back pain which started prior to delivery.    Patient states she had an uneventful prenancy. She had premature rupture of membranes and was given ampicillin and gentamicin prior to . On POD1, patient developed fevers and hypoxia to 93%, and was given ertapenem 1 gram x 1 dose. Blood cultures and urine cultures were negative prior to discharge. Patient states she has some minimal vaginal discharge at this time and is breast feeding the baby.     Hospital course:     In the ED, patient's /80; ; T 100.3- Tmax 102.8, O2 sat 99% on room air. Patient given a dose of clinda and zosyn in the ED. She was admitted to the Medicine team for further management. OBGYN was consulted who determined that patient's fevers were not gyn in origin. She also underwent an U/S of pelvis which did not show intra-uterine abnormalities. Patient endorsed a cough w/ clear sputum production, RVP was obtained. RVP and CXR were negative for infection. UA was positive for moderate leuk esterase and WBC. BCX and UCX were sent. ID was consulted and patient was started on IV zosyn. Hospital course per admitting resident:   23F  with no past medical history, presents 1 week post  section with fevers for past 24 hours. Patient states that last night, she felt chills, and shortly after developed a fever. She took 2 motrins and 2 tylenols overnight. This morning, she again felt chills and had a fever, and this time she measured it reading 103.8, promoting her to come to the ED. She endorses she had fevers and chills prior to leaving the hospital, but on day of discharge was afebrile. Postpartum, patient states she had been having a productive cough with white sputum, that has remained largely unchanged. She endorses shortness of breath with the cough. She endorses some dysuria, denies hematuria, and states she has mild pain at the site of the surgical scar. She also endorses some back pain which started prior to delivery.    Patient states she had an uneventful prenancy. She had premature rupture of membranes and was given ampicillin and gentamicin prior to . On POD1, patient developed fevers and hypoxia to 93%, and was given ertapenem 1 gram x 1 dose. Blood cultures and urine cultures were negative prior to discharge. Patient states she has some minimal vaginal discharge at this time and is breast feeding the baby.     Hospital course:     In the ED, patient's /80; ; T 100.3- Tmax 102.8, O2 sat 99% on room air. Patient given a dose of clinda and zosyn in the ED. She was admitted to the Medicine team for further management. OBGYN was consulted who determined that patient's fevers were not gyn in origin. She also underwent an U/S of pelvis which did not show intra-uterine abnormalities. Patient endorsed a cough w/ clear sputum production, RVP was obtained. RVP and CXR were negative for infection. UA was positive for moderate leuk esterase and WBC. BCX and UCX were sent and patient was started on IV zosyn. ID was consulted who recommended an renal U/S to r/o hydronephrosis, given her elevated temperature of 103. Renal U/s showed _____. Urine culture grew ____ so antibiotics were de-escalated to ____ for a ___ day course. Hospital course per admitting resident:   23F  with no past medical history, presents 1 week post  section with fevers for past 24 hours. Patient states that last night, she felt chills, and shortly after developed a fever. She took 2 motrins and 2 tylenols overnight. This morning, she again felt chills and had a fever, and this time she measured it reading 103.8, promoting her to come to the ED. She endorses she had fevers and chills prior to leaving the hospital, but on day of discharge was afebrile. Postpartum, patient states she had been having a productive cough with white sputum, that has remained largely unchanged. She endorses shortness of breath with the cough. She endorses some dysuria, denies hematuria, and states she has mild pain at the site of the surgical scar. She also endorses some back pain which started prior to delivery.    Patient states she had an uneventful prenancy. She had premature rupture of membranes and was given ampicillin and gentamicin prior to . On POD1, patient developed fevers and hypoxia to 93%, and was given ertapenem 1 gram x 1 dose. Blood cultures and urine cultures were negative prior to discharge. Patient states she has some minimal vaginal discharge at this time and is breast feeding the baby.     Hospital course:     In the ED, patient's /80; ; T 100.3- Tmax 102.8, O2 sat 99% on room air. Patient given a dose of clinda and zosyn in the ED. She was admitted to the Medicine team for further management. OBGYN was consulted who determined that patient's fevers were not gyn in origin. She also underwent an U/S of pelvis which did not show intra-uterine abnormalities. Patient endorsed a cough w/ clear sputum production, RVP was obtained. RVP and CXR were negative for infection. UA was positive for moderate leuk esterase and WBC. BCX and UCX were sent and patient was started on IV zosyn. ID was consulted who recommended an renal U/S to r/o hydronephrosis, given her elevated temperature of 103. Renal U/s showed no findings.  CT abd/pelvis was ngative.  Urine culture negative.    WBC continued to rise however no clear source of infection found.  ID recommended d/c w/ PO augmentin x a total of 14 days.

## 2018-11-03 DIAGNOSIS — R50.9 FEVER, UNSPECIFIED: ICD-10-CM

## 2018-11-03 LAB
BACTERIA UR CULT: SIGNIFICANT CHANGE UP
BASOPHILS # BLD AUTO: 0.04 K/UL — SIGNIFICANT CHANGE UP (ref 0–0.2)
BASOPHILS NFR BLD AUTO: 0.3 % — SIGNIFICANT CHANGE UP (ref 0–2)
EOSINOPHIL # BLD AUTO: 0.18 K/UL — SIGNIFICANT CHANGE UP (ref 0–0.5)
EOSINOPHIL NFR BLD AUTO: 1.3 % — SIGNIFICANT CHANGE UP (ref 0–6)
HCT VFR BLD CALC: 31.4 % — LOW (ref 34.5–45)
HGB BLD-MCNC: 10 G/DL — LOW (ref 11.5–15.5)
IMM GRANULOCYTES # BLD AUTO: 0.31 # — SIGNIFICANT CHANGE UP
IMM GRANULOCYTES NFR BLD AUTO: 2.2 % — HIGH (ref 0–1.5)
LYMPHOCYTES # BLD AUTO: 24.2 % — SIGNIFICANT CHANGE UP (ref 13–44)
LYMPHOCYTES # BLD AUTO: 3.38 K/UL — HIGH (ref 1–3.3)
MCHC RBC-ENTMCNC: 28.5 PG — SIGNIFICANT CHANGE UP (ref 27–34)
MCHC RBC-ENTMCNC: 31.8 % — LOW (ref 32–36)
MCV RBC AUTO: 89.5 FL — SIGNIFICANT CHANGE UP (ref 80–100)
MONOCYTES # BLD AUTO: 1.69 K/UL — HIGH (ref 0–0.9)
MONOCYTES NFR BLD AUTO: 12.1 % — SIGNIFICANT CHANGE UP (ref 2–14)
NEUTROPHILS # BLD AUTO: 8.34 K/UL — HIGH (ref 1.8–7.4)
NEUTROPHILS NFR BLD AUTO: 59.9 % — SIGNIFICANT CHANGE UP (ref 43–77)
NRBC # FLD: 0 — SIGNIFICANT CHANGE UP
PLATELET # BLD AUTO: 242 K/UL — SIGNIFICANT CHANGE UP (ref 150–400)
PMV BLD: 8.8 FL — SIGNIFICANT CHANGE UP (ref 7–13)
RBC # BLD: 3.51 M/UL — LOW (ref 3.8–5.2)
RBC # FLD: 14.6 % — HIGH (ref 10.3–14.5)
SPECIMEN SOURCE: SIGNIFICANT CHANGE UP
WBC # BLD: 13.94 K/UL — HIGH (ref 3.8–10.5)
WBC # FLD AUTO: 13.94 K/UL — HIGH (ref 3.8–10.5)

## 2018-11-03 PROCEDURE — 76770 US EXAM ABDO BACK WALL COMP: CPT | Mod: 26

## 2018-11-03 PROCEDURE — 74177 CT ABD & PELVIS W/CONTRAST: CPT | Mod: 26

## 2018-11-03 PROCEDURE — 99233 SBSQ HOSP IP/OBS HIGH 50: CPT | Mod: GC

## 2018-11-03 RX ADMIN — PIPERACILLIN AND TAZOBACTAM 25 GRAM(S): 4; .5 INJECTION, POWDER, LYOPHILIZED, FOR SOLUTION INTRAVENOUS at 10:18

## 2018-11-03 RX ADMIN — Medication 600 MILLIGRAM(S): at 00:25

## 2018-11-03 RX ADMIN — Medication 975 MILLIGRAM(S): at 13:53

## 2018-11-03 RX ADMIN — Medication 975 MILLIGRAM(S): at 23:20

## 2018-11-03 RX ADMIN — Medication 600 MILLIGRAM(S): at 00:54

## 2018-11-03 RX ADMIN — PIPERACILLIN AND TAZOBACTAM 25 GRAM(S): 4; .5 INJECTION, POWDER, LYOPHILIZED, FOR SOLUTION INTRAVENOUS at 02:20

## 2018-11-03 RX ADMIN — Medication 600 MILLIGRAM(S): at 13:53

## 2018-11-03 RX ADMIN — Medication 1 MILLIGRAM(S): at 13:51

## 2018-11-03 RX ADMIN — ENOXAPARIN SODIUM 40 MILLIGRAM(S): 100 INJECTION SUBCUTANEOUS at 13:49

## 2018-11-03 RX ADMIN — PIPERACILLIN AND TAZOBACTAM 25 GRAM(S): 4; .5 INJECTION, POWDER, LYOPHILIZED, FOR SOLUTION INTRAVENOUS at 20:13

## 2018-11-03 RX ADMIN — Medication 975 MILLIGRAM(S): at 00:54

## 2018-11-03 RX ADMIN — Medication 600 MILLIGRAM(S): at 23:20

## 2018-11-03 RX ADMIN — Medication 975 MILLIGRAM(S): at 00:24

## 2018-11-03 NOTE — PROGRESS NOTE ADULT - ASSESSMENT
23F s/p  10/25/18, p/w fever, chills, dysuria, sore throat and dry cough  Godinez catheter was in place 10/25 --> 10/27  Antibiotics  Amp/Gent 10/25  Ertapenem 10/26  Clinida   Zosyn , now day 3  negative RVP, negative blood cx, urine cx >3 organism  still febrile and the WBC increased to 13    fever due to UTI after  and godinez  also sore throat and dry cough, ?URI  pt clinically better  urine cx>3 organisms    * f/u the abd/pelvis CT  * c/w zosyn for now

## 2018-11-03 NOTE — PROGRESS NOTE ADULT - SUBJECTIVE AND OBJECTIVE BOX
Follow Up:  fever, UTI post     Interval History: pt was still febrile yesterday but the dysuria is better    ROS:      All other systems negative    Constitutional: + fever, no chills  Head: no trauma  Eyes: no vision changes, no eye pain  ENT:  + sore throat, no rhinorrhea  Cardiovascular:  no chest pain, no palpitation  Respiratory:  no SOB, + dry cough  GI:  no abd pain, no vomiting, no diarrhea  urinary: improving dysuria, no hematuria, no flank pain  musculoskeletal:  no joint pain, no joint swelling  skin:  no rash  neurology:  no headache, no seizure, no change in mental status  psych: no anxiety, no depression         Allergies  No Known Drug Allergies  Seafood (Hives)        ANTIMICROBIALS:  piperacillin/tazobactam IVPB. 3.375 every 8 hours      OTHER MEDS:  acetaminophen   Tablet .. 975 milliGRAM(s) Oral every 6 hours  enoxaparin Injectable 40 milliGRAM(s) SubCutaneous daily  folic acid 1 milliGRAM(s) Oral daily  ibuprofen  Tablet. 600 milliGRAM(s) Oral every 6 hours      Vital Signs Last 24 Hrs  T(C): 37.1 (2018 10:45), Max: 38.7 (2018 15:05)  T(F): 98.7 (2018 10:45), Max: 101.6 (2018 15:05)  HR: 75 (2018 10:45) (75 - 96)  BP: 140/87 (2018 10:45) (115/90 - 140/99)  BP(mean): --  RR: 16 (2018 10:45) (16 - 18)  SpO2: 100% (2018 10:45) (100% - 100%)    Physical Exam:  General:    NAD,  non toxic, A&O x 3  Head: atraumatic, normocephalic  Eye: normal sclera and conjunctiva  ENT:    no oropharyngeal lesions,   no LAD,   neck supple  Cardio:     regular S1, S2,  no murmur  Respiratory:    clear b/l,    no wheezing  abd:     soft,   BS +,   no tenderness,    no organomegaly  :   no CVAT,  no suprapubic tenderness,   no  godinez  Musculoskeletal:   no joint swelling,   no edema  vascular: no lines, normal pulses  Skin:    no rash  Neurologic:     no focal deficit  psych: normal affect, no suicidal ideation                          10.0   13.94 )-----------( 242      ( 2018 05:35 )             31.4           138  |  106  |  10  ----------------------------<  94  3.8   |  22  |  0.76    Ca    8.7      2018 06:00  Phos  3.4       Mg     1.9         TPro  6.7  /  Alb  2.7<L>  /  TBili  0.2  /  DBili  x   /  AST  17  /  ALT  17  /  AlkPhos  70        Urinalysis Basic - ( 2018 15:50 )    Color: LIGHT YELLOW / Appearance: Lt TURBID / S.011 / pH: 6.5  Gluc: NEGATIVE / Ketone: NEGATIVE  / Bili: NEGATIVE / Urobili: NORMAL   Blood: MODERATE / Protein: 10 / Nitrite: NEGATIVE   Leuk Esterase: MODERATE / RBC: 0-2 / WBC 11-25   Sq Epi: MODERATE / Non Sq Epi: x / Bacteria: NEGATIVE        MICROBIOLOGY:  v  URINE MIDSTREAM  18 --  --  --      BLOOD  18 --  --  --      BLOOD VENOUS  10-26-18 --  --  --      URINE CATHETER  10-26-18 --  --  --                RADIOLOGY:  Images below reviewed personally  < from:  Kidney and Bladder (18 @ 10:29) >  IMPRESSION:     No renal abnormality.

## 2018-11-03 NOTE — PROGRESS NOTE ADULT - ASSESSMENT
23  POD#9 from pLTCS, readmitted on POD#7 with fevers and tachycardia. She was afebrile overnight. WBC normal. No signs of endometritis or mastitis. Pt asymptomatic and with normal VS overnight. 23  POD#9 from pLTCS, readmitted on POD#7 with fevers and tachycardia. She was afebrile overnight. WBC normal on admission, WBC 13.9 this AM. No signs of endometritis or mastitis. Pt asymptomatic and with normal VS overnight.

## 2018-11-03 NOTE — CHART NOTE - NSCHARTNOTEFT_GEN_A_CORE
Pt requesting to speak with a doctor to review results and plan. Gave patient results of CT scan that showed no pelvic collections or other sources of her recurring fevers. Reviewed results of her lab work this am with increase in wbc count to 13.9, and discussed that she has been on antibiotics due to her recurring fevers. Patient wanted to know the timing of discharge, but I counseled her that she needs repeat labs to see trend of wbc count and deferred the decision about her discharge timing to her attending. Questions answered to patient and 's apparent satisfaction.    SILVANA Donohue PGY-3

## 2018-11-03 NOTE — PROGRESS NOTE ADULT - SUBJECTIVE AND OBJECTIVE BOX
POD # 9 , s/p readmission for postpartum febrile morbidity. pt feels well at the moment and is resting in bed comfortably. Tc- 36.8.( Tmax - 101.6 @ 3p. ) WBC this a.m. revealing a slight elevation at 13.9. pt is on Zosyn . Urine C&S is pending.   pt has CT of Abd/pelvis ordered to r/o abscess , ( secondary to relative lack of symptomatology , but recurring temperature spikes). Renal sono has also been ordered .   A- POD # 9, s/p  section, post op febrile morbidity  P- renal sono, CT of abd./pelvis       continue Zosyn.        will follow up with pt once studies are done         all questions answered.     Asad ROSENBERG

## 2018-11-03 NOTE — PROGRESS NOTE ADULT - SUBJECTIVE AND OBJECTIVE BOX
R3 OB Ante Progress Note POd#9 HD#3    Patient seen and examined at bedside, no acute overnight events. No acute complaints, pain well controlled. Denies fevers overnight.   Patient is ambulating and tolerating regular diet. Passing flatus and voiding. Pt is pumping.     Vital Signs Last 24 Hours  T(C): 36.8 (11-03-18 @ 05:24), Max: 38.7 (11-02-18 @ 15:05)  HR: 78 (11-03-18 @ 05:24) (78 - 96)  BP: 115/90 (11-03-18 @ 05:24) (115/90 - 140/99)  RR: 18 (11-03-18 @ 05:24) (17 - 18)  SpO2: 100% (11-03-18 @ 05:24) (100% - 100%)    I&O's Summary      Physical Exam:  General: NAD  Abdomen: Soft, non-tender, non-distended, fundus firm  Incision: Pfannenstiel incision CDI, subcuticular suture closure  Pelvic: Lochia wnl  Ext: NTBL  Labs:  Blood type: B Positive  Rubella IgG: Positive (10-24 @ 13:45)  RPR: Negative                          10.0<L>   13.94<H> >-----------< 242    ( 11-03 @ 05:35 )             31.4<L>                        10.0<L>   9.09 >-----------< 186    ( 11-02 @ 06:00 )             30.6<L>                        10.6<L>   8.41 >-----------< 205    ( 11-01 @ 14:16 )             32.5<L>    11-02-18 @ 06:00      138  |  106  |  10  ----------------------------<  94  3.8   |  22  |  0.76    11-01-18 @ 14:16      139  |  103  |  14  ----------------------------<  89  4.1   |  20<L>  |  0.83        Ca    8.7      02 Nov 2018 06:00  Ca    8.8      01 Nov 2018 14:16  Phos  3.4     11-02  Mg     1.9     11-02    TPro  6.7  /  Alb  2.7<L>  /  TBili  0.2  /  DBili  x   /  AST  17  /  ALT  17  /  AlkPhos  70  11-02-18 @ 06:00  TPro  7.0  /  Alb  3.1<L>  /  TBili  < 0.2<L>  /  DBili  x   /  AST  22  /  ALT  16  /  AlkPhos  84  11-01-18 @ 14:16          MEDICATIONS  (STANDING):  acetaminophen   Tablet .. 975 milliGRAM(s) Oral every 6 hours  enoxaparin Injectable 40 milliGRAM(s) SubCutaneous daily  folic acid 1 milliGRAM(s) Oral daily  ibuprofen  Tablet. 600 milliGRAM(s) Oral every 6 hours  piperacillin/tazobactam IVPB. 3.375 Gram(s) IV Intermittent every 8 hours    MEDICATIONS  (PRN): R3 OB Ante Progress Note POD#9 HD#3    Patient seen and examined at bedside, no acute overnight events. No acute complaints, pain well controlled. Denies fevers overnight.   Patient is ambulating and tolerating regular diet. Passing flatus and voiding. Pt is pumping.     Vital Signs Last 24 Hours  T(C): 36.8 (11-03-18 @ 05:24), Max: 38.7 (11-02-18 @ 15:05)  HR: 78 (11-03-18 @ 05:24) (78 - 96)  BP: 115/90 (11-03-18 @ 05:24) (115/90 - 140/99)  RR: 18 (11-03-18 @ 05:24) (17 - 18)  SpO2: 100% (11-03-18 @ 05:24) (100% - 100%)    I&O's Summary      Physical Exam:  General: NAD  Abdomen: Soft, non-tender, non-distended, fundus firm  Incision: Pfannenstiel incision CDI, subcuticular suture closure  Pelvic: Lochia wnl  Ext: NTBL  Labs:  Blood type: B Positive  Rubella IgG: Positive (10-24 @ 13:45)  RPR: Negative                          10.0<L>   13.94<H> >-----------< 242    ( 11-03 @ 05:35 )             31.4<L>                        10.0<L>   9.09 >-----------< 186    ( 11-02 @ 06:00 )             30.6<L>                        10.6<L>   8.41 >-----------< 205    ( 11-01 @ 14:16 )             32.5<L>    11-02-18 @ 06:00      138  |  106  |  10  ----------------------------<  94  3.8   |  22  |  0.76    11-01-18 @ 14:16      139  |  103  |  14  ----------------------------<  89  4.1   |  20<L>  |  0.83        Ca    8.7      02 Nov 2018 06:00  Ca    8.8      01 Nov 2018 14:16  Phos  3.4     11-02  Mg     1.9     11-02    TPro  6.7  /  Alb  2.7<L>  /  TBili  0.2  /  DBili  x   /  AST  17  /  ALT  17  /  AlkPhos  70  11-02-18 @ 06:00  TPro  7.0  /  Alb  3.1<L>  /  TBili  < 0.2<L>  /  DBili  x   /  AST  22  /  ALT  16  /  AlkPhos  84  11-01-18 @ 14:16          MEDICATIONS  (STANDING):  acetaminophen   Tablet .. 975 milliGRAM(s) Oral every 6 hours  enoxaparin Injectable 40 milliGRAM(s) SubCutaneous daily  folic acid 1 milliGRAM(s) Oral daily  ibuprofen  Tablet. 600 milliGRAM(s) Oral every 6 hours  piperacillin/tazobactam IVPB. 3.375 Gram(s) IV Intermittent every 8 hours    MEDICATIONS  (PRN):

## 2018-11-03 NOTE — PROGRESS NOTE ADULT - PROBLEM SELECTOR PLAN 1
-AM CBC  -ID following and recommending CTAP   -renal sono to rule out pyelo  -cont zosyn  -reg diet  -tylenol/motrin  -pump q3, no need to dump milk  Terri Nguyen PGY3

## 2018-11-04 LAB
ALBUMIN SERPL ELPH-MCNC: 3.2 G/DL — LOW (ref 3.3–5)
ALP SERPL-CCNC: 74 U/L — SIGNIFICANT CHANGE UP (ref 40–120)
ALT FLD-CCNC: 25 U/L — SIGNIFICANT CHANGE UP (ref 4–33)
APTT BLD: 28.8 SEC — SIGNIFICANT CHANGE UP (ref 27.5–36.3)
AST SERPL-CCNC: 19 U/L — SIGNIFICANT CHANGE UP (ref 4–32)
BASOPHILS # BLD AUTO: 0.06 K/UL — SIGNIFICANT CHANGE UP (ref 0–0.2)
BASOPHILS NFR BLD AUTO: 0.4 % — SIGNIFICANT CHANGE UP (ref 0–2)
BILIRUB SERPL-MCNC: < 0.2 MG/DL — LOW (ref 0.2–1.2)
BUN SERPL-MCNC: 15 MG/DL — SIGNIFICANT CHANGE UP (ref 7–23)
CALCIUM SERPL-MCNC: 8.8 MG/DL — SIGNIFICANT CHANGE UP (ref 8.4–10.5)
CHLORIDE SERPL-SCNC: 103 MMOL/L — SIGNIFICANT CHANGE UP (ref 98–107)
CO2 SERPL-SCNC: 21 MMOL/L — LOW (ref 22–31)
CREAT SERPL-MCNC: 0.7 MG/DL — SIGNIFICANT CHANGE UP (ref 0.5–1.3)
EOSINOPHIL # BLD AUTO: 0.28 K/UL — SIGNIFICANT CHANGE UP (ref 0–0.5)
EOSINOPHIL NFR BLD AUTO: 1.9 % — SIGNIFICANT CHANGE UP (ref 0–6)
FIBRINOGEN PPP-MCNC: 708.2 MG/DL — HIGH (ref 350–510)
GLUCOSE SERPL-MCNC: 98 MG/DL — SIGNIFICANT CHANGE UP (ref 70–99)
HCT VFR BLD CALC: 34.8 % — SIGNIFICANT CHANGE UP (ref 34.5–45)
HGB BLD-MCNC: 10.9 G/DL — LOW (ref 11.5–15.5)
IMM GRANULOCYTES # BLD AUTO: 0.28 # — SIGNIFICANT CHANGE UP
IMM GRANULOCYTES NFR BLD AUTO: 1.9 % — HIGH (ref 0–1.5)
INR BLD: 0.95 — SIGNIFICANT CHANGE UP (ref 0.88–1.17)
LDH SERPL L TO P-CCNC: 266 U/L — HIGH (ref 135–225)
LYMPHOCYTES # BLD AUTO: 23.4 % — SIGNIFICANT CHANGE UP (ref 13–44)
LYMPHOCYTES # BLD AUTO: 3.48 K/UL — HIGH (ref 1–3.3)
MANUAL SMEAR VERIFICATION: SIGNIFICANT CHANGE UP
MCHC RBC-ENTMCNC: 28.3 PG — SIGNIFICANT CHANGE UP (ref 27–34)
MCHC RBC-ENTMCNC: 31.3 % — LOW (ref 32–36)
MCV RBC AUTO: 90.4 FL — SIGNIFICANT CHANGE UP (ref 80–100)
MONOCYTES # BLD AUTO: 1.09 K/UL — HIGH (ref 0–0.9)
MONOCYTES NFR BLD AUTO: 7.3 % — SIGNIFICANT CHANGE UP (ref 2–14)
NEUTROPHILS # BLD AUTO: 9.66 K/UL — HIGH (ref 1.8–7.4)
NEUTROPHILS NFR BLD AUTO: 65.1 % — SIGNIFICANT CHANGE UP (ref 43–77)
NRBC # FLD: 0 — SIGNIFICANT CHANGE UP
PLATELET # BLD AUTO: 291 K/UL — SIGNIFICANT CHANGE UP (ref 150–400)
PMV BLD: 8.6 FL — SIGNIFICANT CHANGE UP (ref 7–13)
POTASSIUM SERPL-MCNC: 3.9 MMOL/L — SIGNIFICANT CHANGE UP (ref 3.5–5.3)
POTASSIUM SERPL-SCNC: 3.9 MMOL/L — SIGNIFICANT CHANGE UP (ref 3.5–5.3)
PROT SERPL-MCNC: 7.4 G/DL — SIGNIFICANT CHANGE UP (ref 6–8.3)
PROTHROM AB SERPL-ACNC: 10.5 SEC — SIGNIFICANT CHANGE UP (ref 9.8–13.1)
RBC # BLD: 3.85 M/UL — SIGNIFICANT CHANGE UP (ref 3.8–5.2)
RBC # FLD: 14.7 % — HIGH (ref 10.3–14.5)
SODIUM SERPL-SCNC: 139 MMOL/L — SIGNIFICANT CHANGE UP (ref 135–145)
URATE SERPL-MCNC: 3.3 MG/DL — SIGNIFICANT CHANGE UP (ref 2.5–7)
WBC # BLD: 14.85 K/UL — HIGH (ref 3.8–10.5)
WBC # FLD AUTO: 14.85 K/UL — HIGH (ref 3.8–10.5)

## 2018-11-04 PROCEDURE — 99232 SBSQ HOSP IP/OBS MODERATE 35: CPT | Mod: GC

## 2018-11-04 RX ADMIN — PIPERACILLIN AND TAZOBACTAM 25 GRAM(S): 4; .5 INJECTION, POWDER, LYOPHILIZED, FOR SOLUTION INTRAVENOUS at 11:41

## 2018-11-04 RX ADMIN — PIPERACILLIN AND TAZOBACTAM 25 GRAM(S): 4; .5 INJECTION, POWDER, LYOPHILIZED, FOR SOLUTION INTRAVENOUS at 04:12

## 2018-11-04 RX ADMIN — Medication 1 MILLIGRAM(S): at 11:42

## 2018-11-04 RX ADMIN — Medication 975 MILLIGRAM(S): at 11:44

## 2018-11-04 RX ADMIN — Medication 600 MILLIGRAM(S): at 12:33

## 2018-11-04 RX ADMIN — Medication 600 MILLIGRAM(S): at 17:13

## 2018-11-04 RX ADMIN — ENOXAPARIN SODIUM 40 MILLIGRAM(S): 100 INJECTION SUBCUTANEOUS at 11:42

## 2018-11-04 RX ADMIN — Medication 600 MILLIGRAM(S): at 18:14

## 2018-11-04 RX ADMIN — Medication 975 MILLIGRAM(S): at 17:13

## 2018-11-04 RX ADMIN — PIPERACILLIN AND TAZOBACTAM 25 GRAM(S): 4; .5 INJECTION, POWDER, LYOPHILIZED, FOR SOLUTION INTRAVENOUS at 20:23

## 2018-11-04 RX ADMIN — Medication 600 MILLIGRAM(S): at 11:41

## 2018-11-04 RX ADMIN — Medication 975 MILLIGRAM(S): at 01:08

## 2018-11-04 RX ADMIN — Medication 600 MILLIGRAM(S): at 01:08

## 2018-11-04 RX ADMIN — Medication 975 MILLIGRAM(S): at 12:33

## 2018-11-04 RX ADMIN — Medication 975 MILLIGRAM(S): at 18:14

## 2018-11-04 NOTE — PROGRESS NOTE ADULT - SUBJECTIVE AND OBJECTIVE BOX
POD#10   HD#4    Patient seen and examined at bedside. No acute overnight events. No acute complaints, pain well controlled. Patient is ambulating. She is passing flatus. Voiding spontaneously and tolerating regular diet. Denies CP, SOB, N/V, fevers, and chills.    Vital Signs Last 24 Hours  T(C): 36.8 (11-04-18 @ 01:58), Max: 37.1 (11-03-18 @ 10:45)  HR: 60 (11-04-18 @ 01:58) (60 - 98)  BP: 134/90 (11-04-18 @ 01:58) (115/90 - 140/98)  RR: 18 (11-04-18 @ 01:58) (16 - 18)  SpO2: 100% (11-04-18 @ 01:58) (100% - 100%)    I&O's Summary    03 Nov 2018 08:01  -  04 Nov 2018 03:24  --------------------------------------------------------  IN: 100 mL / OUT: 0 mL / NET: 100 mL        Physical Exam:  General: AOx3, NAD  CV: RRR  Lungs: CTAB  Abdomen: Soft, non-tender, non-distended, +BS  Incision: Pfannenstiel incision C/D/I  Ext: No pain or swelling    Labs:                        10.0   13.94 )-----------( 242      ( 03 Nov 2018 05:35 )             31.4   baso 0.3    eos 1.3    imm gran 2.2    lymph 24.2   mono 12.1   poly 59.9                         10.0   9.09  )-----------( 186      ( 02 Nov 2018 06:00 )             30.6   baso x      eos x      imm gran x      lymph x      mono x      poly x                            10.6   8.41  )-----------( 205      ( 01 Nov 2018 14:16 )             32.5   baso 0.4    eos 1.0    imm gran 4.0    lymph 10.5   mono 5.1    poly 79.0       MEDICATIONS  (STANDING):  acetaminophen   Tablet .. 975 milliGRAM(s) Oral every 6 hours  enoxaparin Injectable 40 milliGRAM(s) SubCutaneous daily  folic acid 1 milliGRAM(s) Oral daily  ibuprofen  Tablet. 600 milliGRAM(s) Oral every 6 hours  piperacillin/tazobactam IVPB. 3.375 Gram(s) IV Intermittent every 8 hours    MEDICATIONS  (PRN):

## 2018-11-04 NOTE — PROGRESS NOTE ADULT - ASSESSMENT
23F s/p  10/25/18, p/w fever, chills, dysuria, sore throat and dry cough  Godinez catheter was in place 10/25 --> 10/27  Antibiotics  Amp/Gent 10/25  Ertapenem 10/26  Clinida   Zosyn , now day 3  negative RVP, negative blood cx, urine cx >3 organism  still febrile and the WBC increased to 13    fever due to UTI after  and godinez  also sore throat and dry cough, ?URI  urine cx>3 organisms  abd/pelvis CT unremarkable  pt clinically better but leukocytosis worsening      * c/w zosyn for now, started , now day 4  * monitor the WBC

## 2018-11-04 NOTE — PROGRESS NOTE ADULT - SUBJECTIVE AND OBJECTIVE BOX
Follow Up:  fever, UTI post     Interval History: pt afebrile today with improving dysuria but the WBC is increaseing    ROS:      All other systems negative    Constitutional: no  fever, no chills  Head: no trauma  Eyes: no vision changes, no eye pain  ENT:  + sore throat, no rhinorrhea  Cardiovascular:  no chest pain, no palpitation  Respiratory:  no SOB, + dry cough  GI:  no abd pain, no vomiting, no diarrhea  urinary: improving dysuria, no hematuria, no flank pain  musculoskeletal:  no joint pain, no joint swelling  skin:  no rash  neurology:  no headache, no seizure, no change in mental status  psych: no anxiety, no depression         Allergies  No Known Drug Allergies  Seafood (Hives)        ANTIMICROBIALS:  piperacillin/tazobactam IVPB. 3.375 every 8 hours      OTHER MEDS:  acetaminophen   Tablet .. 975 milliGRAM(s) Oral every 6 hours  enoxaparin Injectable 40 milliGRAM(s) SubCutaneous daily  folic acid 1 milliGRAM(s) Oral daily  ibuprofen  Tablet. 600 milliGRAM(s) Oral every 6 hours      Vital Signs Last 24 Hrs  T(C): 36.7 (2018 06:04), Max: 37.1 (2018 14:17)  T(F): 98 (2018 06:04), Max: 98.7 (2018 14:17)  HR: 70 (2018 06:04) (60 - 98)  BP: 138/89 (2018 06:04) (120/97 - 140/98)  BP(mean): --  RR: 18 (2018 06:04) (16 - 18)  SpO2: 100% (2018 06:04) (100% - 100%)    Physical Exam:  General:    NAD,  non toxic, A&O x 3  Head: atraumatic, normocephalic  Eye: normal sclera and conjunctiva  ENT:    no oropharyngeal lesions,   no LAD,   neck supple  Cardio:     regular S1, S2,  no murmur  Respiratory:    clear b/l,    no wheezing  abd:     soft,   BS +,   no tenderness,    no organomegaly  :   no CVAT,  no suprapubic tenderness,   no  godinez  Musculoskeletal:   no joint swelling,   no edema  vascular: no lines, normal pulses  Skin:    no rash  Neurologic:     no focal deficit  psych: normal affect, no suicidal ideation                            10.9   14.85 )-----------( 291      ( 2018 05:52 )             34.8           139  |  103  |  15  ----------------------------<  98  3.9   |  21<L>  |  0.70    Ca    8.8      2018 05:52    TPro  7.4  /  Alb  3.2<L>  /  TBili  < 0.2<L>  /  DBili  x   /  AST  19  /  ALT  25  /  AlkPhos  74  -04          MICROBIOLOGY:  v  URINE MIDSTREAM  18 --  --  --      BLOOD  18 --  --  --      BLOOD VENOUS  10-26-18 --  --  --      URINE CATHETER  10-26-18 --  --  --                RADIOLOGY:  Images below reviewed personally  < from: CT Abdomen and Pelvis w/ IV Cont (18 @ 11:04) >  IMPRESSION:   Postpartum uterus with likely a prominent lower anterior segment    defect. No fluid collection.

## 2018-11-04 NOTE — PROGRESS NOTE ADULT - ATTENDING COMMENTS
Pt seen and examined by me today. I agree with findings, assessment and plan documented in resident note.Pt seen with Dr Win in afternoon 11/4/18. Explained to pt plan by ID to continue zosyn until 48 hrs afebrile.
Patient seen and examined. Chart/lab reviewed. Agree with above with modifications.  23F  with no PMH, s/p 1 wk , p/w fever, sepsis.  C/o dysuria/frequency, also has cough and congestion.  RVP and CXR neg. Seen by obgyn, pelvic US thickened endometrium w/o vascularity, no e/o endometritis at this time per gyn.  PE: nontoxic, lung clear, abdomen soft, nt,  site clean, no leg edema    Assessment/plan:  # fever, sepsis, URI vs. UTI: f/u cultures, on zosyn, monitor temp curve, ID consult, f/u obgyn  # dispo: d/c home pending cx and clinical improvement

## 2018-11-04 NOTE — PROGRESS NOTE ADULT - ASSESSMENT
24yo F POD 10 s/p pLTCS, now HD 4 readmitted for fevers and tachycardia of unclear etiology. Now afebrile since 11/2

## 2018-11-04 NOTE — PROGRESS NOTE ADULT - PROBLEM SELECTOR PLAN 1
Neuro: Continue po pain meds  CV: Hemodynamically stable, BPs 130s systolic  Pulm: Saturating well on room air, encourage oob/amb  GI: Regular diet  : UOP adequate, voiding freely  ID: day 4 of Zosyn. Ucx with three species, bcx NGTD. CT A/P and renal sono wnl. Patient has been afebrile since 11/2@3p. ID following, appreciate recs. f/u AM CBC  Heme: c/w lovenox and SCDs for DVT ppx. f/u AM HELLP labs  Dispo: Continue routine post-op care, dispo pending AM labs and pt's symptoms     Marie Donohue, PGY-3

## 2018-11-05 VITALS
SYSTOLIC BLOOD PRESSURE: 126 MMHG | DIASTOLIC BLOOD PRESSURE: 86 MMHG | OXYGEN SATURATION: 100 % | RESPIRATION RATE: 18 BRPM | HEART RATE: 104 BPM

## 2018-11-05 LAB
BACTERIA UR CULT: SIGNIFICANT CHANGE UP
BASOPHILS # BLD AUTO: 0.06 K/UL — SIGNIFICANT CHANGE UP (ref 0–0.2)
BASOPHILS NFR BLD AUTO: 0.4 % — SIGNIFICANT CHANGE UP (ref 0–2)
BUN SERPL-MCNC: 14 MG/DL — SIGNIFICANT CHANGE UP (ref 7–23)
CALCIUM SERPL-MCNC: 8.8 MG/DL — SIGNIFICANT CHANGE UP (ref 8.4–10.5)
CHLORIDE SERPL-SCNC: 102 MMOL/L — SIGNIFICANT CHANGE UP (ref 98–107)
CO2 SERPL-SCNC: 21 MMOL/L — LOW (ref 22–31)
CREAT SERPL-MCNC: 0.7 MG/DL — SIGNIFICANT CHANGE UP (ref 0.5–1.3)
EOSINOPHIL # BLD AUTO: 0.23 K/UL — SIGNIFICANT CHANGE UP (ref 0–0.5)
EOSINOPHIL NFR BLD AUTO: 1.5 % — SIGNIFICANT CHANGE UP (ref 0–6)
GLUCOSE SERPL-MCNC: 91 MG/DL — SIGNIFICANT CHANGE UP (ref 70–99)
HCT VFR BLD CALC: 32.6 % — LOW (ref 34.5–45)
HGB BLD-MCNC: 10.6 G/DL — LOW (ref 11.5–15.5)
IMM GRANULOCYTES # BLD AUTO: 0.23 # — SIGNIFICANT CHANGE UP
IMM GRANULOCYTES NFR BLD AUTO: 1.5 % — SIGNIFICANT CHANGE UP (ref 0–1.5)
LYMPHOCYTES # BLD AUTO: 25 % — SIGNIFICANT CHANGE UP (ref 13–44)
LYMPHOCYTES # BLD AUTO: 3.77 K/UL — HIGH (ref 1–3.3)
MCHC RBC-ENTMCNC: 29.3 PG — SIGNIFICANT CHANGE UP (ref 27–34)
MCHC RBC-ENTMCNC: 32.5 % — SIGNIFICANT CHANGE UP (ref 32–36)
MCV RBC AUTO: 90.1 FL — SIGNIFICANT CHANGE UP (ref 80–100)
MONOCYTES # BLD AUTO: 0.93 K/UL — HIGH (ref 0–0.9)
MONOCYTES NFR BLD AUTO: 6.2 % — SIGNIFICANT CHANGE UP (ref 2–14)
NEUTROPHILS # BLD AUTO: 9.84 K/UL — HIGH (ref 1.8–7.4)
NEUTROPHILS NFR BLD AUTO: 65.4 % — SIGNIFICANT CHANGE UP (ref 43–77)
NRBC # FLD: 0 — SIGNIFICANT CHANGE UP
PLATELET # BLD AUTO: 326 K/UL — SIGNIFICANT CHANGE UP (ref 150–400)
PMV BLD: 8.5 FL — SIGNIFICANT CHANGE UP (ref 7–13)
POTASSIUM SERPL-MCNC: 4 MMOL/L — SIGNIFICANT CHANGE UP (ref 3.5–5.3)
POTASSIUM SERPL-SCNC: 4 MMOL/L — SIGNIFICANT CHANGE UP (ref 3.5–5.3)
RBC # BLD: 3.62 M/UL — LOW (ref 3.8–5.2)
RBC # FLD: 14.6 % — HIGH (ref 10.3–14.5)
SODIUM SERPL-SCNC: 138 MMOL/L — SIGNIFICANT CHANGE UP (ref 135–145)
SPECIMEN SOURCE: SIGNIFICANT CHANGE UP
WBC # BLD: 15.06 K/UL — HIGH (ref 3.8–10.5)
WBC # FLD AUTO: 15.06 K/UL — HIGH (ref 3.8–10.5)

## 2018-11-05 PROCEDURE — 99232 SBSQ HOSP IP/OBS MODERATE 35: CPT

## 2018-11-05 PROCEDURE — 99238 HOSP IP/OBS DSCHRG MGMT 30/<: CPT

## 2018-11-05 RX ADMIN — Medication 975 MILLIGRAM(S): at 00:32

## 2018-11-05 RX ADMIN — Medication 975 MILLIGRAM(S): at 06:11

## 2018-11-05 RX ADMIN — Medication 600 MILLIGRAM(S): at 00:32

## 2018-11-05 RX ADMIN — Medication 600 MILLIGRAM(S): at 01:12

## 2018-11-05 RX ADMIN — Medication 600 MILLIGRAM(S): at 06:11

## 2018-11-05 RX ADMIN — Medication 600 MILLIGRAM(S): at 06:41

## 2018-11-05 RX ADMIN — PIPERACILLIN AND TAZOBACTAM 25 GRAM(S): 4; .5 INJECTION, POWDER, LYOPHILIZED, FOR SOLUTION INTRAVENOUS at 03:54

## 2018-11-05 RX ADMIN — Medication 975 MILLIGRAM(S): at 01:12

## 2018-11-05 RX ADMIN — Medication 975 MILLIGRAM(S): at 06:41

## 2018-11-05 RX ADMIN — PIPERACILLIN AND TAZOBACTAM 25 GRAM(S): 4; .5 INJECTION, POWDER, LYOPHILIZED, FOR SOLUTION INTRAVENOUS at 09:31

## 2018-11-05 NOTE — PROGRESS NOTE ADULT - SUBJECTIVE AND OBJECTIVE BOX
Follow Up:  fever    Interval History/ROS: continued cough - productive of white phlegm    Allergies  No Known Drug Allergies  Seafood (Hives)    ANTIMICROBIALS:  piperacillin/tazobactam IVPB. 3.375 every 8 hours    OTHER MEDS:  MEDICATIONS  (STANDING):  acetaminophen   Tablet .. 975 every 6 hours  enoxaparin Injectable 40 daily  ibuprofen  Tablet. 600 every 6 hours    Vital Signs Last 24 Hrs  T(C): 36.7 (2018 10:10), Max: 36.9 (2018 14:22)  afebrile since 18  T(F): 98.1 (2018 10:10), Max: 98.5 (2018 18:00)  HR: 75 (2018 10:10) (69 - 78)  BP: 139/97 (2018 10:10) (130/93 - 139/97)  BP(mean): --  RR: 17 (2018 10:10) (16 - 18)  SpO2: 100% (2018 10:10) (97% - 100%)    PHYSICAL EXAM:  General: WN/WD NAD, Non-toxic  Neurology: A&Ox3, nonfocal  Respiratory: Clear to auscultation bilaterally  CV: RRR, S1S2, no murmurs, rubs or gallops  Abdominal: Soft, Non-tender, non-distended, normal bowel sounds - incision site intact  Extremities: No edema,  Line Sites: Clear  Skin: No rash                        10.6   15.06 )-----------( 326      ( 2018 05:55 )             32.6   WBC Count: 15.06 ( @ 05:55)  WBC Count: 14.85 ( @ 05:52)  WBC Count: 13.94 ( @ 05:35)  WBC Count: 9.09 ( @ 06:00)  WBC Count: 8.41 ( @ 14:16)          138  |  102  |  14  ----------------------------<  91  4.0   |  21<L>  |  0.70    Ca    8.8      2018 05:55    TPro  7.4  /  Alb  3.2<L>  /  TBili  < 0.2<L>  /  DBili  x   /  AST  19  /  ALT  25  /  AlkPhos  74        MICROBIOLOGY:  URINE MIDSTREAM  18 --  --  --      URINE MIDSTREAM  18 --  --  --      BLOOD  18 --  --  --      BLOOD VENOUS  10-26-18 --  --  --      URINE CATHETER  10-26-18 --  --  --    RADIOLOGY:  images viewed  < from: CT Abdomen and Pelvis w/ IV Cont (18 @ 11:04) >  LOWER CHEST: Within normal limits.    LIVER: Within normal limits.  BILE DUCTS: Normal caliber.  GALLBLADDER: Within normal limits.  SPLEEN: Within normal limits.  PANCREAS: Within normal limits.  ADRENALS: Within normal limits.  KIDNEYS/URETERS: Symmetrically enhance without hydronephrosis.    BLADDER: Within normal limits.  REPRODUCTIVE ORGANS: Postpartum appearance of the uterus with a prominent   lower anterior segment defect. Minimal surrounding free fluid.    BOWEL: No bowel obstruction. Appendix is normal.  PERITONEUM: No ascites. No fluid collection.  VESSELS:  Within normal limits.  RETROPERITONEUM: No lymphadenopathy.    ABDOMINAL WALL: Lower abdominal wall incision without evidence of fluid   collection or abscess.  BONES: Within normal limits.    IMPRESSION:   Postpartum uterus with likely a prominent lower anterior segment    defect. No fluid collection.    < end of copied text >      Khari Hanna MD; Division of Infectious Disease; Pager: [phone]; nights and weekends: [phone]

## 2018-11-05 NOTE — PROGRESS NOTE ADULT - SUBJECTIVE AND OBJECTIVE BOX
[facility] Progress Note    POD#11  HD#5    Patient seen and examined at bedside.  No acute events overnight. No acute complaints.  Denies pain.  Reports some continued mild dysuria.  Improved from past.    Patient is ambulating and tolerating regular diet.  Patient is passing flatus.  Patient is voiding spontaneously.  Denies URI symptoms.  Denies diarrhea/constipation.  Denies breast pain/tenderness/discharge.   Denies CP, SOB, N/V, fevers, and chills.    Vital Signs Last 24 Hours  T(C): 36.8 (11-05-18 @ 06:25), Max: 36.9 (11-04-18 @ 14:22)  HR: 69 (11-05-18 @ 06:25) (69 - 78)  BP: 134/93 (11-05-18 @ 06:25) (130/93 - 134/93)  RR: 18 (11-05-18 @ 06:25) (16 - 18)  SpO2: 100% (11-05-18 @ 06:25) (97% - 100%)    I&O's Summary    03 Nov 2018 08:01  -  04 Nov 2018 07:00  --------------------------------------------------------  IN: 100 mL / OUT: 0 mL / NET: 100 mL    04 Nov 2018 07:01  -  05 Nov 2018 06:39  --------------------------------------------------------  IN: 820 mL / OUT: 0 mL / NET: 820 mL        Physical Exam:  General: NAD  Breasts:  soft, no erythema or induration, milky discharge  CV: RR, S1, S2, no M/R/G  Lungs: CTA b/l, good air flow b/l   Back: no CVA tenderness  Abdomen: Soft, NTND normoactive bowel sounds  Incision: CDI w/ no erythema or induration  : no bleeding on pad  Ext: No pain or swelling.  IV site intact with no erythema or induration    Labs:                        10.9   14.85 )-----------( 291      ( 04 Nov 2018 05:52 )             34.8   baso 0.4    eos 1.9    imm gran 1.9    lymph 23.4   mono 7.3    poly 65.1                         10.0   13.94 )-----------( 242      ( 03 Nov 2018 05:35 )             31.4   baso 0.3    eos 1.3    imm gran 2.2    lymph 24.2   mono 12.1   poly 59.9       MEDICATIONS  (STANDING):  acetaminophen   Tablet .. 975 milliGRAM(s) Oral every 6 hours  enoxaparin Injectable 40 milliGRAM(s) SubCutaneous daily  folic acid 1 milliGRAM(s) Oral daily  ibuprofen  Tablet. 600 milliGRAM(s) Oral every 6 hours  piperacillin/tazobactam IVPB. 3.375 Gram(s) IV Intermittent every 8 hours    MEDICATIONS  (PRN):

## 2018-11-05 NOTE — PROGRESS NOTE ADULT - PROBLEM SELECTOR PLAN 1
Neuro: Continue po pain meds  CV: Hemodynamically stable,  no signs of sepsis at this time.   Pulm: Saturating well on room air, encourage oob/amb  GI: Regular diet  : UOP adequate, voiding freely  ID: day 5 of Zosyn. Ucx with three species, bcx NGTD. CT A/P and renal sono wnl. Patient has been afebrile since 11/2@3p. ID following, appreciate recs. f/u AM CBC  Heme: c/w lovenox and SCDs for DVT ppx.   Dispo: Continue routine post-op care, dispo pending AM labs and pt's symptoms     Candelaria Arevalo PGY_3

## 2018-11-05 NOTE — PROGRESS NOTE ADULT - ASSESSMENT
23F s/p  10/25/18, p/w fever, chills, dysuria, sore throat and dry cough  fever and dysuria resolved  [place] catheter was in place 10/25 --> 10/27    Antibiotics  Amp/Gent 10/25  Ertapenem 10/26  Clinida   Zosyn      negative RVP, negative blood cx, urine cx >3 organism  increased WBC - abd CT scan shows no evidence of infection/collection    excellent clinical appearance - infected clots in uterus/septic pelvic thrombophlebitis are considerations but seem unlikely - Patient is VERY anxious for discharge    I would favor discharge on po Augmentin 875 twice daily with close out-patient follow up

## 2018-11-05 NOTE — PROGRESS NOTE ADULT - ASSESSMENT
24yo POD#11 s/p pLTCS, now HD#5 readmitted for fevers and tachycardia of unclear etiology. Now afebrile since 11/2.  All work up to date has been negative.  Differential includes endometritis vs. viral syndrome vs. UTI.   Negative CT abd/pelvis for intra-abdominal abscess.  Negative renal US- no CVA tenderness, no concern for pyelonephritis.   No symptoms of viral URI or GI virus.   Patient w/ no focal complaints.  Continues broad spectrum antibiotics but w/ increasing leukocytosis yesterday.  Will f/u AM CBC and f/u ID recommendations

## 2018-11-06 LAB
BACTERIA BLD CULT: SIGNIFICANT CHANGE UP
BACTERIA BLD CULT: SIGNIFICANT CHANGE UP

## 2018-11-07 ENCOUNTER — APPOINTMENT (OUTPATIENT)
Dept: OBGYN | Facility: CLINIC | Age: 23
End: 2018-11-07

## 2018-11-07 DIAGNOSIS — T88.8XXD OTHER SPECIFIED COMPLICATIONS OF SURGICAL AND MEDICAL CARE, NOT ELSEWHERE CLASSIFIED, SUBSEQUENT ENCOUNTER: ICD-10-CM

## 2018-11-08 ENCOUNTER — APPOINTMENT (OUTPATIENT)
Dept: OBGYN | Facility: CLINIC | Age: 23
End: 2018-11-08
Payer: COMMERCIAL

## 2018-11-08 ENCOUNTER — ASOB RESULT (OUTPATIENT)
Age: 23
End: 2018-11-08

## 2018-11-08 VITALS
HEART RATE: 121 BPM | HEIGHT: 59 IN | SYSTOLIC BLOOD PRESSURE: 141 MMHG | BODY MASS INDEX: 28.28 KG/M2 | WEIGHT: 140.31 LBS | TEMPERATURE: 98.2 F | DIASTOLIC BLOOD PRESSURE: 93 MMHG

## 2018-11-08 PROCEDURE — 76857 US EXAM PELVIC LIMITED: CPT

## 2018-11-08 PROCEDURE — 76830 TRANSVAGINAL US NON-OB: CPT

## 2018-11-08 PROCEDURE — 99213 OFFICE O/P EST LOW 20 MIN: CPT

## 2018-11-09 LAB
BASOPHILS # BLD AUTO: 0.05 K/UL
BASOPHILS NFR BLD AUTO: 0.3 %
EOSINOPHIL # BLD AUTO: 0.19 K/UL
EOSINOPHIL NFR BLD AUTO: 1.2 %
HCT VFR BLD CALC: 35.7 %
HGB BLD-MCNC: 11.9 G/DL
IMM GRANULOCYTES NFR BLD AUTO: 0.8 %
LYMPHOCYTES # BLD AUTO: 3.06 K/UL
LYMPHOCYTES NFR BLD AUTO: 19.4 %
MAN DIFF?: NORMAL
MCHC RBC-ENTMCNC: 29.5 PG
MCHC RBC-ENTMCNC: 33.3 GM/DL
MCV RBC AUTO: 88.6 FL
MONOCYTES # BLD AUTO: 0.72 K/UL
MONOCYTES NFR BLD AUTO: 4.6 %
NEUTROPHILS # BLD AUTO: 11.6 K/UL
NEUTROPHILS NFR BLD AUTO: 73.7 %
PLATELET # BLD AUTO: 407 K/UL
RBC # BLD: 4.03 M/UL
RBC # FLD: 15.3 %
WBC # FLD AUTO: 15.75 K/UL

## 2018-12-04 ENCOUNTER — APPOINTMENT (OUTPATIENT)
Dept: OBGYN | Facility: CLINIC | Age: 23
End: 2018-12-04
Payer: COMMERCIAL

## 2018-12-04 VITALS
WEIGHT: 144 LBS | HEART RATE: 93 BPM | DIASTOLIC BLOOD PRESSURE: 76 MMHG | BODY MASS INDEX: 29.03 KG/M2 | SYSTOLIC BLOOD PRESSURE: 130 MMHG | HEIGHT: 59 IN

## 2018-12-04 DIAGNOSIS — Z34.02 ENCOUNTER FOR SUPERVISION OF NORMAL FIRST PREGNANCY, SECOND TRIMESTER: ICD-10-CM

## 2018-12-04 PROCEDURE — 0503F POSTPARTUM CARE VISIT: CPT

## 2020-02-18 NOTE — ED PROVIDER NOTE - CADM POA URETHRAL CATHETER
77 y/o Male, current smoker (1-2 PPD x67 years), with a PMHx of COPD, PVD s/p right BKA, s/p fem/popliteal bilateral bypass, diastolic CHF, CAD, PCI 2005, DMII (insulin pump) who was taken via ambulance to [facility] ED with SOB and chest pain and was admitted for community acquired pneumonia. A CT scan completed there showed right sided middle lobe lung mass suspicious for malignancy and upon further workup he was diagnosed with an NSTEMI. Stress testing was positive and cardiac catheterization demonstrated mid LAD stenosis (70%). The patient was transferred to [facility] under the care of Dr. Saez, CTSx, for evaluation and management. Decision was made to hold on cardiac intervention pending further workup of lung mass. Patient is planned for IR biopsy today with interventional pulm.     Plan:  Problem 1: Right lung mass  - MRI Brain on 2/13/20, results above, no large mass or enhancing focus in the brain, small sites of metastatic disease are not excluded (marked motion degraded study). Punctate focus of signal in right superior thalamus questionable for recent lacunar infarct.  - PET CT 2/13/20: FDG avid right hilar mass, FGD avid right lower paratracheal and prevascular LN, bilateral hilar LN, palatine tonsil  - Patient is planned for lung biopsy with interventional pulm today.   - F/u results.   - Continue to hold plavix prior to biopsy    Problem 2: NSTEMI  - Not currently surgical candidate for cardiac intervention  - Hold plavix prior to biopsy, continue with ASA  - High risk patient for general anesthesiology, rec cardiac anesthesia for procedure.   - Continue atorvastatin 80 mg    I have reviewed clinical labs tests and reports, radiology tests and reports, as well as old patient medical records, and discussed with the refering physician. No

## 2022-02-07 NOTE — LACTATION INITIAL EVALUATION - DELIVERY MODE
Quality 137: Melanoma: Continuity Of Care - Recall System: Patient information entered into a recall system that includes: target date for the next exam specified AND a process to follow up with patients regarding missed or unscheduled appointments Quality 226: Preventive Care And Screening: Tobacco Use: Screening And Cessation Intervention: Patient screened for tobacco use and is an ex/non-smoker Quality 431: Preventive Care And Screening: Unhealthy Alcohol Use - Screening: Patient screened for unhealthy alcohol use using a single question and scores less than 2 times per year Quality 138: Melanoma: Coordination Of Care: A treatment plan was communicated to the physicians providing continuing care within one month of diagnosis outlining: diagnosis, tumor thickness and a plan for surgery or alternate care. Quality 130: Documentation Of Current Medications In The Medical Record: Current Medications Documented Detail Level: Detailed breast

## 2022-12-20 ENCOUNTER — APPOINTMENT (OUTPATIENT)
Dept: OBGYN | Facility: CLINIC | Age: 27
End: 2022-12-20

## 2022-12-20 VITALS
HEART RATE: 102 BPM | DIASTOLIC BLOOD PRESSURE: 88 MMHG | BODY MASS INDEX: 29.23 KG/M2 | HEIGHT: 59 IN | WEIGHT: 145 LBS | SYSTOLIC BLOOD PRESSURE: 131 MMHG

## 2022-12-20 DIAGNOSIS — N91.1 SECONDARY AMENORRHEA: ICD-10-CM

## 2022-12-20 DIAGNOSIS — R10.9 UNSPECIFIED ABDOMINAL PAIN: ICD-10-CM

## 2022-12-20 LAB
HCG UR QL: NEGATIVE
QUALITY CONTROL: YES

## 2022-12-20 PROCEDURE — 99213 OFFICE O/P EST LOW 20 MIN: CPT | Mod: 25

## 2022-12-20 PROCEDURE — 99385 PREV VISIT NEW AGE 18-39: CPT

## 2022-12-20 RX ORDER — NORETHINDRONE 0.35 MG/1
0.35 TABLET ORAL DAILY
Qty: 1 | Refills: 8 | Status: DISCONTINUED | COMMUNITY
Start: 2018-12-04 | End: 2022-12-20

## 2022-12-21 LAB
C TRACH RRNA SPEC QL NAA+PROBE: NOT DETECTED
FSH SERPL-MCNC: 7.2 IU/L
HCG SERPL-MCNC: <1 MIU/ML
N GONORRHOEA RRNA SPEC QL NAA+PROBE: NOT DETECTED
PROLACTIN SERPL-MCNC: 8.2 NG/ML
SOURCE AMPLIFICATION: NORMAL
TSH SERPL-ACNC: 1.81 UIU/ML

## 2023-01-05 LAB — CYTOLOGY CVX/VAG DOC THIN PREP: NORMAL

## 2023-03-09 ENCOUNTER — ASOB RESULT (OUTPATIENT)
Age: 28
End: 2023-03-09

## 2023-03-09 ENCOUNTER — APPOINTMENT (OUTPATIENT)
Dept: OBGYN | Facility: CLINIC | Age: 28
End: 2023-03-09
Payer: COMMERCIAL

## 2023-03-09 VITALS
SYSTOLIC BLOOD PRESSURE: 123 MMHG | DIASTOLIC BLOOD PRESSURE: 84 MMHG | HEIGHT: 59 IN | BODY MASS INDEX: 29.84 KG/M2 | HEART RATE: 89 BPM | WEIGHT: 148 LBS

## 2023-03-09 DIAGNOSIS — Z32.01 ENCOUNTER FOR PREGNANCY TEST, RESULT POSITIVE: ICD-10-CM

## 2023-03-09 DIAGNOSIS — O21.9 VOMITING OF PREGNANCY, UNSPECIFIED: ICD-10-CM

## 2023-03-09 PROCEDURE — 99214 OFFICE O/P EST MOD 30 MIN: CPT

## 2023-03-09 PROCEDURE — 76817 TRANSVAGINAL US OBSTETRIC: CPT

## 2023-03-14 LAB
ABO + RH PNL BLD: NORMAL
ALBUMIN SERPL ELPH-MCNC: 4 G/DL
ALP BLD-CCNC: 69 U/L
ALT SERPL-CCNC: 12 U/L
ANION GAP SERPL CALC-SCNC: 11 MMOL/L
AST SERPL-CCNC: 13 U/L
BACTERIA UR CULT: NORMAL
BASOPHILS # BLD AUTO: 0.03 K/UL
BASOPHILS NFR BLD AUTO: 0.3 %
BILIRUB SERPL-MCNC: 0.2 MG/DL
BLD GP AB SCN SERPL QL: NORMAL
BUN SERPL-MCNC: 9 MG/DL
C TRACH RRNA SPEC QL NAA+PROBE: NOT DETECTED
CALCIUM SERPL-MCNC: 9 MG/DL
CHLORIDE SERPL-SCNC: 102 MMOL/L
CO2 SERPL-SCNC: 24 MMOL/L
CREAT SERPL-MCNC: 0.5 MG/DL
EGFR: 129 ML/MIN/1.73M2
EOSINOPHIL # BLD AUTO: 0.13 K/UL
EOSINOPHIL NFR BLD AUTO: 1.1 %
ESTIMATED AVERAGE GLUCOSE: 123 MG/DL
GLUCOSE SERPL-MCNC: 114 MG/DL
HBA1C MFR BLD HPLC: 5.9 %
HBV SURFACE AG SER QL: NONREACTIVE
HCT VFR BLD CALC: 38.6 %
HCV AB SER QL: NONREACTIVE
HCV S/CO RATIO: 0.13 S/CO
HGB BLD-MCNC: 12.1 G/DL
HIV1+2 AB SPEC QL IA.RAPID: NONREACTIVE
IMM GRANULOCYTES NFR BLD AUTO: 0.6 %
LEAD BLD-MCNC: <1 UG/DL
LYMPHOCYTES # BLD AUTO: 2.91 K/UL
LYMPHOCYTES NFR BLD AUTO: 24.4 %
MAN DIFF?: NORMAL
MCHC RBC-ENTMCNC: 27.4 PG
MCHC RBC-ENTMCNC: 31.3 GM/DL
MCV RBC AUTO: 87.3 FL
MONOCYTES # BLD AUTO: 0.5 K/UL
MONOCYTES NFR BLD AUTO: 4.2 %
N GONORRHOEA RRNA SPEC QL NAA+PROBE: NOT DETECTED
NEUTROPHILS # BLD AUTO: 8.28 K/UL
NEUTROPHILS NFR BLD AUTO: 69.4 %
PLATELET # BLD AUTO: 346 K/UL
POTASSIUM SERPL-SCNC: 4.4 MMOL/L
PROT SERPL-MCNC: 7.4 G/DL
RBC # BLD: 4.42 M/UL
RBC # FLD: 14.7 %
RUBV IGG FLD-ACNC: 1 INDEX
RUBV IGG SER-IMP: NORMAL
SODIUM SERPL-SCNC: 137 MMOL/L
SOURCE AMPLIFICATION: NORMAL
T PALLIDUM AB SER QL IA: NEGATIVE
VZV AB TITR SER: POSITIVE
VZV IGG SER IF-ACNC: 248.3 INDEX
WBC # FLD AUTO: 11.92 K/UL

## 2023-03-15 ENCOUNTER — NON-APPOINTMENT (OUTPATIENT)
Age: 28
End: 2023-03-15

## 2023-03-15 ENCOUNTER — TRANSCRIPTION ENCOUNTER (OUTPATIENT)
Age: 28
End: 2023-03-15

## 2023-03-24 LAB
B19V IGG SER QL IA: 0.23 INDEX
B19V IGG+IGM SER-IMP: NEGATIVE
B19V IGG+IGM SER-IMP: NORMAL
B19V IGM FLD-ACNC: 0.11 INDEX
B19V IGM SER-ACNC: NEGATIVE

## 2023-03-30 ENCOUNTER — TRANSCRIPTION ENCOUNTER (OUTPATIENT)
Age: 28
End: 2023-03-30

## 2023-04-06 ENCOUNTER — APPOINTMENT (OUTPATIENT)
Dept: ANTEPARTUM | Facility: CLINIC | Age: 28
End: 2023-04-06
Payer: COMMERCIAL

## 2023-04-06 ENCOUNTER — APPOINTMENT (OUTPATIENT)
Dept: OBGYN | Facility: CLINIC | Age: 28
End: 2023-04-06
Payer: COMMERCIAL

## 2023-04-06 ENCOUNTER — ASOB RESULT (OUTPATIENT)
Age: 28
End: 2023-04-06

## 2023-04-06 VITALS
BODY MASS INDEX: 30.64 KG/M2 | HEIGHT: 59 IN | DIASTOLIC BLOOD PRESSURE: 74 MMHG | WEIGHT: 152 LBS | HEART RATE: 94 BPM | SYSTOLIC BLOOD PRESSURE: 116 MMHG

## 2023-04-06 PROCEDURE — 0502F SUBSEQUENT PRENATAL CARE: CPT

## 2023-04-06 PROCEDURE — 76801 OB US < 14 WKS SINGLE FETUS: CPT

## 2023-04-06 PROCEDURE — 76813 OB US NUCHAL MEAS 1 GEST: CPT

## 2023-05-04 ENCOUNTER — TRANSCRIPTION ENCOUNTER (OUTPATIENT)
Age: 28
End: 2023-05-04

## 2023-05-04 RX ORDER — VITAMIN C, CALCIUM, IRON, VITAMIN D3, VITAMIN E, THIAMIN, RIBOFLAVIN, NIACINAMIDE, VITAMIN B6, FOLIC ACID, IODINE, ZINC, COPPER, DOCUSATE SODIUM
27-1 & 250 KIT
Qty: 30 | Refills: 6 | Status: DISCONTINUED | COMMUNITY
Start: 2023-03-09 | End: 2023-05-04

## 2023-05-04 RX ORDER — CALCIUM CITRATE, IRON PENTACARBONYL, CHOLECALCIFEROL, .ALPHA.-TOCOPHEROL, DL-, PYRIDOXINE HYDROCHLORIDE, FOLIC ACID, DOCUSATE SODIUM, AND DOCONEXENT 104; 27; 400; 30; 25; 1; 50; 260 MG/1; MG/1; [IU]/1; [IU]/1; MG/1; MG/1; MG/1; MG/1
27-1-260 CAPSULE, GELATIN COATED ORAL
Qty: 30 | Refills: 6 | Status: DISCONTINUED | COMMUNITY
Start: 2023-03-24 | End: 2023-05-04

## 2023-05-05 ENCOUNTER — TRANSCRIPTION ENCOUNTER (OUTPATIENT)
Age: 28
End: 2023-05-05

## 2023-05-11 ENCOUNTER — APPOINTMENT (OUTPATIENT)
Dept: OBGYN | Facility: CLINIC | Age: 28
End: 2023-05-11
Payer: COMMERCIAL

## 2023-05-11 VITALS
BODY MASS INDEX: 30.04 KG/M2 | SYSTOLIC BLOOD PRESSURE: 124 MMHG | HEIGHT: 59 IN | WEIGHT: 149 LBS | DIASTOLIC BLOOD PRESSURE: 85 MMHG

## 2023-05-11 PROCEDURE — 0502F SUBSEQUENT PRENATAL CARE: CPT

## 2023-06-02 LAB
AFP MOM: 0.78
AFP VALUE: 28.4 NG/ML
ALPHA FETOPROTEIN SERUM COMMENT: NORMAL
ALPHA FETOPROTEIN SERUM INTERPRETATION: NORMAL
ALPHA FETOPROTEIN SERUM RESULTS: NORMAL
ALPHA FETOPROTEIN SERUM TEST RESULTS: NORMAL
GESTATIONAL AGE BASED ON: NORMAL
GESTATIONAL AGE ON COLLECTION DATE: 16.4 WEEKS
INSULIN DEP DIABETES: NO
MATERNAL AGE AT EDD AFP: 30.7 YR
MULTIPLE GESTATION: NO
OSBR RISK 1 IN: NORMAL
RACE: NORMAL
WEIGHT AFP: 149 LBS

## 2023-06-12 ENCOUNTER — APPOINTMENT (OUTPATIENT)
Dept: ANTEPARTUM | Facility: CLINIC | Age: 28
End: 2023-06-12
Payer: COMMERCIAL

## 2023-06-12 ENCOUNTER — ASOB RESULT (OUTPATIENT)
Age: 28
End: 2023-06-12

## 2023-06-12 PROCEDURE — 76805 OB US >/= 14 WKS SNGL FETUS: CPT

## 2023-06-13 ENCOUNTER — NON-APPOINTMENT (OUTPATIENT)
Age: 28
End: 2023-06-13

## 2023-06-21 ENCOUNTER — APPOINTMENT (OUTPATIENT)
Dept: OBGYN | Facility: CLINIC | Age: 28
End: 2023-06-21
Payer: COMMERCIAL

## 2023-06-21 VITALS
BODY MASS INDEX: 30.64 KG/M2 | DIASTOLIC BLOOD PRESSURE: 70 MMHG | HEIGHT: 59 IN | HEART RATE: 97 BPM | SYSTOLIC BLOOD PRESSURE: 105 MMHG | WEIGHT: 152 LBS

## 2023-06-21 PROCEDURE — 0502F SUBSEQUENT PRENATAL CARE: CPT

## 2023-07-11 ENCOUNTER — APPOINTMENT (OUTPATIENT)
Dept: OBGYN | Facility: CLINIC | Age: 28
End: 2023-07-11
Payer: COMMERCIAL

## 2023-07-11 VITALS
HEIGHT: 59 IN | BODY MASS INDEX: 31.04 KG/M2 | DIASTOLIC BLOOD PRESSURE: 79 MMHG | WEIGHT: 154 LBS | SYSTOLIC BLOOD PRESSURE: 117 MMHG

## 2023-07-11 DIAGNOSIS — Z34.82 ENCOUNTER FOR SUPERVISION OF OTHER NORMAL PREGNANCY, SECOND TRIMESTER: ICD-10-CM

## 2023-07-11 PROCEDURE — 0502F SUBSEQUENT PRENATAL CARE: CPT

## 2023-07-12 ENCOUNTER — TRANSCRIPTION ENCOUNTER (OUTPATIENT)
Age: 28
End: 2023-07-12

## 2023-07-12 ENCOUNTER — NON-APPOINTMENT (OUTPATIENT)
Age: 28
End: 2023-07-12

## 2023-07-18 LAB
GLUCOSE 1H P 50 G GLC PO SERPL-MCNC: 187 MG/DL
T PALLIDUM AB SER QL IA: NEGATIVE

## 2023-07-19 ENCOUNTER — TRANSCRIPTION ENCOUNTER (OUTPATIENT)
Age: 28
End: 2023-07-19

## 2023-07-31 ENCOUNTER — ASOB RESULT (OUTPATIENT)
Age: 28
End: 2023-07-31

## 2023-07-31 ENCOUNTER — APPOINTMENT (OUTPATIENT)
Dept: MATERNAL FETAL MEDICINE | Facility: CLINIC | Age: 28
End: 2023-07-31
Payer: COMMERCIAL

## 2023-07-31 PROCEDURE — G0109 DIAB MANAGE TRN IND/GROUP: CPT | Mod: 95

## 2023-08-01 RX ORDER — BLOOD-GLUCOSE METER
KIT MISCELLANEOUS 4 TIMES DAILY
Qty: 2 | Refills: 2 | Status: ACTIVE | COMMUNITY
Start: 2023-07-31 | End: 1900-01-01

## 2023-08-01 RX ORDER — PEN NEEDLE, DIABETIC 32GX 5/32"
32G X 4 MM NEEDLE, DISPOSABLE MISCELLANEOUS
Qty: 2 | Refills: 1 | Status: ACTIVE | COMMUNITY
Start: 2023-07-31 | End: 1900-01-01

## 2023-08-01 RX ORDER — BLOOD-GLUCOSE METER
W/DEVICE KIT MISCELLANEOUS
Qty: 1 | Refills: 0 | Status: ACTIVE | COMMUNITY
Start: 2023-07-31 | End: 1900-01-01

## 2023-08-08 ENCOUNTER — APPOINTMENT (OUTPATIENT)
Dept: OBGYN | Facility: CLINIC | Age: 28
End: 2023-08-08
Payer: COMMERCIAL

## 2023-08-08 VITALS
WEIGHT: 153 LBS | HEART RATE: 85 BPM | SYSTOLIC BLOOD PRESSURE: 111 MMHG | DIASTOLIC BLOOD PRESSURE: 76 MMHG | HEIGHT: 59 IN | BODY MASS INDEX: 30.84 KG/M2

## 2023-08-08 PROCEDURE — 0502F SUBSEQUENT PRENATAL CARE: CPT

## 2023-08-08 RX ORDER — MEDROXYPROGESTERONE ACETATE 10 MG/1
10 TABLET ORAL DAILY
Qty: 10 | Refills: 0 | Status: DISCONTINUED | COMMUNITY
Start: 2022-12-20 | End: 2023-08-08

## 2023-08-10 ENCOUNTER — ASOB RESULT (OUTPATIENT)
Age: 28
End: 2023-08-10

## 2023-08-10 ENCOUNTER — APPOINTMENT (OUTPATIENT)
Dept: MATERNAL FETAL MEDICINE | Facility: CLINIC | Age: 28
End: 2023-08-10
Payer: COMMERCIAL

## 2023-08-10 PROCEDURE — G0108 DIAB MANAGE TRN  PER INDIV: CPT | Mod: 95

## 2023-08-24 ENCOUNTER — APPOINTMENT (OUTPATIENT)
Dept: OBGYN | Facility: CLINIC | Age: 28
End: 2023-08-24
Payer: COMMERCIAL

## 2023-08-24 VITALS
DIASTOLIC BLOOD PRESSURE: 70 MMHG | HEART RATE: 96 BPM | WEIGHT: 153 LBS | SYSTOLIC BLOOD PRESSURE: 119 MMHG | HEIGHT: 59 IN | BODY MASS INDEX: 30.84 KG/M2

## 2023-08-24 PROCEDURE — 0502F SUBSEQUENT PRENATAL CARE: CPT

## 2023-08-25 ENCOUNTER — ASOB RESULT (OUTPATIENT)
Age: 28
End: 2023-08-25

## 2023-08-25 ENCOUNTER — APPOINTMENT (OUTPATIENT)
Dept: MATERNAL FETAL MEDICINE | Facility: CLINIC | Age: 28
End: 2023-08-25
Payer: COMMERCIAL

## 2023-08-25 PROCEDURE — G0108 DIAB MANAGE TRN  PER INDIV: CPT | Mod: 95

## 2023-08-30 ENCOUNTER — APPOINTMENT (OUTPATIENT)
Dept: ANTEPARTUM | Facility: CLINIC | Age: 28
End: 2023-08-30
Payer: COMMERCIAL

## 2023-08-30 ENCOUNTER — ASOB RESULT (OUTPATIENT)
Age: 28
End: 2023-08-30

## 2023-08-30 PROCEDURE — 76819 FETAL BIOPHYS PROFIL W/O NST: CPT | Mod: 59

## 2023-08-30 PROCEDURE — 76816 OB US FOLLOW-UP PER FETUS: CPT

## 2023-09-05 ENCOUNTER — APPOINTMENT (OUTPATIENT)
Dept: OBGYN | Facility: CLINIC | Age: 28
End: 2023-09-05

## 2023-09-05 ENCOUNTER — TRANSCRIPTION ENCOUNTER (OUTPATIENT)
Age: 28
End: 2023-09-05

## 2023-09-07 ENCOUNTER — APPOINTMENT (OUTPATIENT)
Dept: OBGYN | Facility: CLINIC | Age: 28
End: 2023-09-07
Payer: COMMERCIAL

## 2023-09-07 VITALS
WEIGHT: 156 LBS | HEART RATE: 89 BPM | BODY MASS INDEX: 31.45 KG/M2 | DIASTOLIC BLOOD PRESSURE: 74 MMHG | HEIGHT: 59 IN | SYSTOLIC BLOOD PRESSURE: 112 MMHG

## 2023-09-07 DIAGNOSIS — O24.419 GESTATIONAL DIABETES MELLITUS IN PREGNANCY, UNSPECIFIED CONTROL: ICD-10-CM

## 2023-09-07 PROCEDURE — 90715 TDAP VACCINE 7 YRS/> IM: CPT

## 2023-09-07 PROCEDURE — 90471 IMMUNIZATION ADMIN: CPT

## 2023-09-07 PROCEDURE — 0502F SUBSEQUENT PRENATAL CARE: CPT

## 2023-09-15 ENCOUNTER — APPOINTMENT (OUTPATIENT)
Dept: MATERNAL FETAL MEDICINE | Facility: CLINIC | Age: 28
End: 2023-09-15
Payer: COMMERCIAL

## 2023-09-15 ENCOUNTER — ASOB RESULT (OUTPATIENT)
Age: 28
End: 2023-09-15

## 2023-09-15 PROCEDURE — G0108 DIAB MANAGE TRN  PER INDIV: CPT | Mod: 95

## 2023-09-20 ENCOUNTER — APPOINTMENT (OUTPATIENT)
Dept: OBGYN | Facility: CLINIC | Age: 28
End: 2023-09-20
Payer: COMMERCIAL

## 2023-09-20 VITALS
BODY MASS INDEX: 31.45 KG/M2 | DIASTOLIC BLOOD PRESSURE: 79 MMHG | HEART RATE: 103 BPM | HEIGHT: 59 IN | WEIGHT: 156 LBS | SYSTOLIC BLOOD PRESSURE: 118 MMHG

## 2023-09-20 PROCEDURE — 0502F SUBSEQUENT PRENATAL CARE: CPT

## 2023-09-27 ENCOUNTER — ASOB RESULT (OUTPATIENT)
Age: 28
End: 2023-09-27

## 2023-09-27 ENCOUNTER — APPOINTMENT (OUTPATIENT)
Dept: ANTEPARTUM | Facility: CLINIC | Age: 28
End: 2023-09-27

## 2023-09-27 ENCOUNTER — APPOINTMENT (OUTPATIENT)
Dept: ANTEPARTUM | Facility: CLINIC | Age: 28
End: 2023-09-27
Payer: COMMERCIAL

## 2023-09-27 PROCEDURE — 76818 FETAL BIOPHYS PROFILE W/NST: CPT

## 2023-09-27 PROCEDURE — 76816 OB US FOLLOW-UP PER FETUS: CPT

## 2023-09-29 ENCOUNTER — ASOB RESULT (OUTPATIENT)
Age: 28
End: 2023-09-29

## 2023-09-29 ENCOUNTER — APPOINTMENT (OUTPATIENT)
Dept: MATERNAL FETAL MEDICINE | Facility: CLINIC | Age: 28
End: 2023-09-29
Payer: COMMERCIAL

## 2023-09-29 PROCEDURE — G0108 DIAB MANAGE TRN  PER INDIV: CPT | Mod: 95

## 2023-10-05 ENCOUNTER — ASOB RESULT (OUTPATIENT)
Age: 28
End: 2023-10-05

## 2023-10-05 ENCOUNTER — APPOINTMENT (OUTPATIENT)
Dept: OBGYN | Facility: CLINIC | Age: 28
End: 2023-10-05
Payer: COMMERCIAL

## 2023-10-05 ENCOUNTER — APPOINTMENT (OUTPATIENT)
Dept: ANTEPARTUM | Facility: CLINIC | Age: 28
End: 2023-10-05
Payer: COMMERCIAL

## 2023-10-05 VITALS
HEART RATE: 85 BPM | DIASTOLIC BLOOD PRESSURE: 86 MMHG | BODY MASS INDEX: 31.45 KG/M2 | HEIGHT: 59 IN | SYSTOLIC BLOOD PRESSURE: 121 MMHG | WEIGHT: 156 LBS

## 2023-10-05 DIAGNOSIS — Z34.83 ENCOUNTER FOR SUPERVISION OF OTHER NORMAL PREGNANCY, THIRD TRIMESTER: ICD-10-CM

## 2023-10-05 PROCEDURE — 76818 FETAL BIOPHYS PROFILE W/NST: CPT

## 2023-10-05 PROCEDURE — 0502F SUBSEQUENT PRENATAL CARE: CPT

## 2023-10-07 LAB
GP B STREP DNA SPEC QL NAA+PROBE: NOT DETECTED
HIV1+2 AB SPEC QL IA.RAPID: NONREACTIVE
SOURCE GBS: NORMAL

## 2023-10-09 ENCOUNTER — APPOINTMENT (OUTPATIENT)
Dept: ANTEPARTUM | Facility: CLINIC | Age: 28
End: 2023-10-09

## 2023-10-10 ENCOUNTER — TRANSCRIPTION ENCOUNTER (OUTPATIENT)
Age: 28
End: 2023-10-10

## 2023-10-10 ENCOUNTER — APPOINTMENT (OUTPATIENT)
Dept: OBGYN | Facility: CLINIC | Age: 28
End: 2023-10-10
Payer: COMMERCIAL

## 2023-10-10 VITALS
SYSTOLIC BLOOD PRESSURE: 118 MMHG | DIASTOLIC BLOOD PRESSURE: 80 MMHG | WEIGHT: 157 LBS | HEIGHT: 59 IN | HEART RATE: 87 BPM | BODY MASS INDEX: 31.65 KG/M2

## 2023-10-10 DIAGNOSIS — O24.419 GESTATIONAL DIABETES MELLITUS IN PREGNANCY, UNSPECIFIED CONTROL: ICD-10-CM

## 2023-10-10 PROCEDURE — 0502F SUBSEQUENT PRENATAL CARE: CPT

## 2023-10-11 ENCOUNTER — APPOINTMENT (OUTPATIENT)
Dept: ANTEPARTUM | Facility: CLINIC | Age: 28
End: 2023-10-11

## 2023-10-11 ENCOUNTER — EMERGENCY (EMERGENCY)
Facility: HOSPITAL | Age: 28
LOS: 1 days | Discharge: STILL ADMITTED OR ADMINISTRATIVE | End: 2023-10-11
Admitting: EMERGENCY MEDICINE
Payer: COMMERCIAL

## 2023-10-11 ENCOUNTER — TRANSCRIPTION ENCOUNTER (OUTPATIENT)
Age: 28
End: 2023-10-11

## 2023-10-11 ENCOUNTER — INPATIENT (INPATIENT)
Facility: HOSPITAL | Age: 28
LOS: 2 days | Discharge: HOME | End: 2023-10-14
Attending: OBSTETRICS & GYNECOLOGY | Admitting: OBSTETRICS & GYNECOLOGY
Payer: COMMERCIAL

## 2023-10-11 VITALS — SYSTOLIC BLOOD PRESSURE: 125 MMHG | HEART RATE: 88 BPM | DIASTOLIC BLOOD PRESSURE: 81 MMHG

## 2023-10-11 DIAGNOSIS — O26.899 OTHER SPECIFIED PREGNANCY RELATED CONDITIONS, UNSPECIFIED TRIMESTER: ICD-10-CM

## 2023-10-11 DIAGNOSIS — O42.90 PREMATURE RUPTURE OF MEMBRANES, UNSPECIFIED AS TO LENGTH OF TIME BETWEEN RUPTURE AND ONSET OF LABOR, UNSPECIFIED WEEKS OF GESTATION: ICD-10-CM

## 2023-10-11 DIAGNOSIS — Z98.890 OTHER SPECIFIED POSTPROCEDURAL STATES: Chronic | ICD-10-CM

## 2023-10-11 DIAGNOSIS — Z98.891 HISTORY OF UTERINE SCAR FROM PREVIOUS SURGERY: Chronic | ICD-10-CM

## 2023-10-11 LAB
BASOPHILS # BLD AUTO: 0.03 K/UL — SIGNIFICANT CHANGE UP (ref 0–0.2)
BASOPHILS NFR BLD AUTO: 0.3 % — SIGNIFICANT CHANGE UP (ref 0–2)
EOSINOPHIL # BLD AUTO: 0.09 K/UL — SIGNIFICANT CHANGE UP (ref 0–0.5)
EOSINOPHIL NFR BLD AUTO: 0.9 % — SIGNIFICANT CHANGE UP (ref 0–6)
GLUCOSE BLDC GLUCOMTR-MCNC: 109 MG/DL — HIGH (ref 70–99)
HCT VFR BLD CALC: 38.9 % — SIGNIFICANT CHANGE UP (ref 34.5–45)
HGB BLD-MCNC: 13.2 G/DL — SIGNIFICANT CHANGE UP (ref 11.5–15.5)
IANC: 6.89 K/UL — SIGNIFICANT CHANGE UP (ref 1.8–7.4)
IMM GRANULOCYTES NFR BLD AUTO: 0.3 % — SIGNIFICANT CHANGE UP (ref 0–0.9)
LYMPHOCYTES # BLD AUTO: 2.1 K/UL — SIGNIFICANT CHANGE UP (ref 1–3.3)
LYMPHOCYTES # BLD AUTO: 21.5 % — SIGNIFICANT CHANGE UP (ref 13–44)
MCHC RBC-ENTMCNC: 28.7 PG — SIGNIFICANT CHANGE UP (ref 27–34)
MCHC RBC-ENTMCNC: 33.9 GM/DL — SIGNIFICANT CHANGE UP (ref 32–36)
MCV RBC AUTO: 84.6 FL — SIGNIFICANT CHANGE UP (ref 80–100)
MONOCYTES # BLD AUTO: 0.65 K/UL — SIGNIFICANT CHANGE UP (ref 0–0.9)
MONOCYTES NFR BLD AUTO: 6.6 % — SIGNIFICANT CHANGE UP (ref 2–14)
NEUTROPHILS # BLD AUTO: 6.89 K/UL — SIGNIFICANT CHANGE UP (ref 1.8–7.4)
NEUTROPHILS NFR BLD AUTO: 70.4 % — SIGNIFICANT CHANGE UP (ref 43–77)
NRBC # BLD: 0 /100 WBCS — SIGNIFICANT CHANGE UP (ref 0–0)
NRBC # FLD: 0 K/UL — SIGNIFICANT CHANGE UP (ref 0–0)
PLATELET # BLD AUTO: 293 K/UL — SIGNIFICANT CHANGE UP (ref 150–400)
RBC # BLD: 4.6 M/UL — SIGNIFICANT CHANGE UP (ref 3.8–5.2)
RBC # FLD: 16.9 % — HIGH (ref 10.3–14.5)
T PALLIDUM AB TITR SER: NEGATIVE — SIGNIFICANT CHANGE UP
WBC # BLD: 9.79 K/UL — SIGNIFICANT CHANGE UP (ref 3.8–10.5)
WBC # FLD AUTO: 9.79 K/UL — SIGNIFICANT CHANGE UP (ref 3.8–10.5)

## 2023-10-11 PROCEDURE — L9996: CPT

## 2023-10-11 PROCEDURE — 59510 CESAREAN DELIVERY: CPT | Mod: U9,GC

## 2023-10-11 RX ORDER — SODIUM CHLORIDE 9 MG/ML
1000 INJECTION, SOLUTION INTRAVENOUS
Refills: 0 | Status: DISCONTINUED | OUTPATIENT
Start: 2023-10-11 | End: 2023-10-11

## 2023-10-11 RX ORDER — IBUPROFEN 200 MG
600 TABLET ORAL EVERY 6 HOURS
Refills: 0 | Status: COMPLETED | OUTPATIENT
Start: 2023-10-11 | End: 2024-09-08

## 2023-10-11 RX ORDER — KETOROLAC TROMETHAMINE 30 MG/ML
30 SYRINGE (ML) INJECTION EVERY 6 HOURS
Refills: 0 | Status: COMPLETED | OUTPATIENT
Start: 2023-10-11 | End: 2023-10-12

## 2023-10-11 RX ORDER — OXYTOCIN 10 UNIT/ML
333.33 VIAL (ML) INJECTION
Qty: 20 | Refills: 0 | Status: DISCONTINUED | OUTPATIENT
Start: 2023-10-11 | End: 2023-10-11

## 2023-10-11 RX ORDER — SODIUM CHLORIDE 9 MG/ML
1000 INJECTION, SOLUTION INTRAVENOUS ONCE
Refills: 0 | Status: DISCONTINUED | OUTPATIENT
Start: 2023-10-11 | End: 2023-10-11

## 2023-10-11 RX ORDER — ACETAMINOPHEN 500 MG
1000 TABLET ORAL ONCE
Refills: 0 | Status: COMPLETED | OUTPATIENT
Start: 2023-10-11 | End: 2023-10-11

## 2023-10-11 RX ORDER — SODIUM CHLORIDE 9 MG/ML
1000 INJECTION, SOLUTION INTRAVENOUS
Refills: 0 | Status: DISCONTINUED | OUTPATIENT
Start: 2023-10-11 | End: 2023-10-12

## 2023-10-11 RX ORDER — TETANUS TOXOID, REDUCED DIPHTHERIA TOXOID AND ACELLULAR PERTUSSIS VACCINE, ADSORBED 5; 2.5; 8; 8; 2.5 [IU]/.5ML; [IU]/.5ML; UG/.5ML; UG/.5ML; UG/.5ML
0.5 SUSPENSION INTRAMUSCULAR ONCE
Refills: 0 | Status: DISCONTINUED | OUTPATIENT
Start: 2023-10-11 | End: 2023-10-14

## 2023-10-11 RX ORDER — ACETAMINOPHEN 500 MG
975 TABLET ORAL
Refills: 0 | Status: DISCONTINUED | OUTPATIENT
Start: 2023-10-11 | End: 2023-10-14

## 2023-10-11 RX ORDER — MAGNESIUM HYDROXIDE 400 MG/1
30 TABLET, CHEWABLE ORAL
Refills: 0 | Status: DISCONTINUED | OUTPATIENT
Start: 2023-10-11 | End: 2023-10-14

## 2023-10-11 RX ORDER — SODIUM CHLORIDE 9 MG/ML
1000 INJECTION, SOLUTION INTRAVENOUS ONCE
Refills: 0 | Status: DISCONTINUED | OUTPATIENT
Start: 2023-10-11 | End: 2023-10-12

## 2023-10-11 RX ORDER — CITRIC ACID/SODIUM CITRATE 300-500 MG
30 SOLUTION, ORAL ORAL ONCE
Refills: 0 | Status: DISCONTINUED | OUTPATIENT
Start: 2023-10-11 | End: 2023-10-11

## 2023-10-11 RX ORDER — SODIUM CHLORIDE 9 MG/ML
500 INJECTION, SOLUTION INTRAVENOUS ONCE
Refills: 0 | Status: DISCONTINUED | OUTPATIENT
Start: 2023-10-11 | End: 2023-10-14

## 2023-10-11 RX ORDER — OXYCODONE HYDROCHLORIDE 5 MG/1
5 TABLET ORAL
Refills: 0 | Status: ACTIVE | OUTPATIENT
Start: 2023-10-11 | End: 2023-10-18

## 2023-10-11 RX ORDER — SODIUM CHLORIDE 9 MG/ML
500 INJECTION, SOLUTION INTRAVENOUS ONCE
Refills: 0 | Status: DISCONTINUED | OUTPATIENT
Start: 2023-10-11 | End: 2023-10-12

## 2023-10-11 RX ORDER — SODIUM CHLORIDE 9 MG/ML
1000 INJECTION, SOLUTION INTRAVENOUS ONCE
Refills: 0 | Status: DISCONTINUED | OUTPATIENT
Start: 2023-10-11 | End: 2023-10-14

## 2023-10-11 RX ORDER — IBUPROFEN 200 MG
1 TABLET ORAL
Qty: 0 | Refills: 0 | DISCHARGE

## 2023-10-11 RX ORDER — LANOLIN
1 OINTMENT (GRAM) TOPICAL EVERY 6 HOURS
Refills: 0 | Status: DISCONTINUED | OUTPATIENT
Start: 2023-10-11 | End: 2023-10-14

## 2023-10-11 RX ORDER — FAMOTIDINE 10 MG/ML
20 INJECTION INTRAVENOUS ONCE
Refills: 0 | Status: DISCONTINUED | OUTPATIENT
Start: 2023-10-11 | End: 2023-10-11

## 2023-10-11 RX ORDER — HEPARIN SODIUM 5000 [USP'U]/ML
5000 INJECTION INTRAVENOUS; SUBCUTANEOUS EVERY 12 HOURS
Refills: 0 | Status: DISCONTINUED | OUTPATIENT
Start: 2023-10-11 | End: 2023-10-14

## 2023-10-11 RX ORDER — INFLUENZA VIRUS VACCINE 15; 15; 15; 15 UG/.5ML; UG/.5ML; UG/.5ML; UG/.5ML
0.5 SUSPENSION INTRAMUSCULAR ONCE
Refills: 0 | Status: DISCONTINUED | OUTPATIENT
Start: 2023-10-11 | End: 2023-10-14

## 2023-10-11 RX ORDER — FAMOTIDINE 10 MG/ML
20 INJECTION INTRAVENOUS ONCE
Refills: 0 | Status: COMPLETED | OUTPATIENT
Start: 2023-10-11 | End: 2023-10-11

## 2023-10-11 RX ORDER — SIMETHICONE 80 MG/1
80 TABLET, CHEWABLE ORAL EVERY 4 HOURS
Refills: 0 | Status: DISCONTINUED | OUTPATIENT
Start: 2023-10-11 | End: 2023-10-14

## 2023-10-11 RX ORDER — OXYTOCIN 10 UNIT/ML
333.33 VIAL (ML) INJECTION
Qty: 20 | Refills: 0 | Status: DISCONTINUED | OUTPATIENT
Start: 2023-10-11 | End: 2023-10-12

## 2023-10-11 RX ORDER — OXYCODONE HYDROCHLORIDE 5 MG/1
5 TABLET ORAL ONCE
Refills: 0 | Status: DISCONTINUED | OUTPATIENT
Start: 2023-10-11 | End: 2023-10-14

## 2023-10-11 RX ORDER — SODIUM CHLORIDE 9 MG/ML
1000 INJECTION, SOLUTION INTRAVENOUS ONCE
Refills: 0 | Status: COMPLETED | OUTPATIENT
Start: 2023-10-11 | End: 2023-10-11

## 2023-10-11 RX ORDER — DIPHENHYDRAMINE HCL 50 MG
25 CAPSULE ORAL EVERY 6 HOURS
Refills: 0 | Status: DISCONTINUED | OUTPATIENT
Start: 2023-10-11 | End: 2023-10-14

## 2023-10-11 RX ORDER — CITRIC ACID/SODIUM CITRATE 300-500 MG
30 SOLUTION, ORAL ORAL ONCE
Refills: 0 | Status: COMPLETED | OUTPATIENT
Start: 2023-10-11 | End: 2023-10-11

## 2023-10-11 RX ADMIN — SODIUM CHLORIDE 1000 MILLILITER(S): 9 INJECTION, SOLUTION INTRAVENOUS at 03:40

## 2023-10-11 RX ADMIN — Medication 975 MILLIGRAM(S): at 21:05

## 2023-10-11 RX ADMIN — Medication 975 MILLIGRAM(S): at 15:14

## 2023-10-11 RX ADMIN — Medication 30 MILLIGRAM(S): at 18:00

## 2023-10-11 RX ADMIN — Medication 975 MILLIGRAM(S): at 15:44

## 2023-10-11 RX ADMIN — Medication 1000 MILLIGRAM(S): at 12:12

## 2023-10-11 RX ADMIN — Medication 400 MILLIGRAM(S): at 11:26

## 2023-10-11 RX ADMIN — SODIUM CHLORIDE 75 MILLILITER(S): 9 INJECTION, SOLUTION INTRAVENOUS at 08:35

## 2023-10-11 RX ADMIN — Medication 1000 MILLIUNIT(S)/MIN: at 08:35

## 2023-10-11 RX ADMIN — HEPARIN SODIUM 5000 UNIT(S): 5000 INJECTION INTRAVENOUS; SUBCUTANEOUS at 17:28

## 2023-10-11 RX ADMIN — SODIUM CHLORIDE 125 MILLILITER(S): 9 INJECTION, SOLUTION INTRAVENOUS at 05:05

## 2023-10-11 RX ADMIN — Medication 975 MILLIGRAM(S): at 22:00

## 2023-10-11 RX ADMIN — Medication 30 MILLILITER(S): at 05:30

## 2023-10-11 RX ADMIN — Medication 30 MILLIGRAM(S): at 17:28

## 2023-10-11 RX ADMIN — FAMOTIDINE 20 MILLIGRAM(S): 10 INJECTION INTRAVENOUS at 05:43

## 2023-10-11 NOTE — OB PROVIDER H&P - NSHPPHYSICALEXAM_GEN_ALL_CORE
ICU Vital Signs Last 24 Hrs  T(C): 37.2 (11 Oct 2023 02:35), Max: 37.2 (11 Oct 2023 02:13)  T(F): 99 (11 Oct 2023 02:35), Max: 99 (11 Oct 2023 02:35)  HR: 88 (11 Oct 2023 02:35) (88 - 88)  BP: 125/81 (11 Oct 2023 02:35) (125/81 - 125/81)  BP(mean): --  ABP: --  ABP(mean): --  RR: 18 (11 Oct 2023 02:35) (18 - 18)  SpO2: --    Abdomen soft nontender  Speculum: Positive pooling and nitrazine  SVE: 1/60/-3  TAS: Cephalic presentation   GBS: Neg. (10/5/23)  EFW: 3200gms by dorads   Category I/Reactive NST  Evangelista 5-10mins

## 2023-10-11 NOTE — OB RN PATIENT PROFILE - NS_PRENATALLOC_OBGYN_ALL_OB
Impression: Tear film insufficiency of bilateral lacrimal glands: H04.123. Plan: Recommend patient to use ATs QID or more OU. MD Office

## 2023-10-11 NOTE — OB PROVIDER H&P - HISTORY OF PRESENT ILLNESS
Pt. is a 29y/o  EGA 38.2wks reports of leakage of fluid around 1am and mild irregular contractions pain 5/10. Pt. denies vaginal bleeding. Pt. reports good fetal movement. Pt. is scheduled for repeat  10/20/23.     NPO since  (Chicken and rice)     AP: GDMA2

## 2023-10-11 NOTE — OB RN PATIENT PROFILE - NS_ADMITVITALS_OBGYN_ALL_OB_DT
KALI .  Patient called to report he was seen at the [facility] over the weekend for a UTI, was treated with IV antibiotics and sent home on oral Cipro.  Please call with any questions.    11-Oct-2023 04:00

## 2023-10-11 NOTE — ED ADULT TRIAGE NOTE - CHIEF COMPLAINT QUOTE
pt 38 weeks pregnant c/o SROM 20 minutes prior to arrival and pressure. denies contractions. DAVID 10/23, scheduled for  10/20. Phx gestational diabetes . pt 38 weeks pregnant c/o SROM 20 minutes prior to arrival and pressure. mild contractions beginning in triage. DAVID 10/23, scheduled for  10/20. Phx gestational diabetes .

## 2023-10-11 NOTE — PROCEDURAL SAFETY CHECKLIST WITH OR WITHOUT SEDATION - NSTEAMNURSEFT_GEN_ALL_CORE
Not been walking yet with back brace. Neurosurgery note appreciated. Consider kyphoplasty in future if needed for pain. No weakness or neuropathic pain in legs. Slept well. Has urinary incontinence. No dysuria. Blood pressure 138/70. Pulse of 71  Lungs clear. Heart regular. No edema.   Plan: Acute L1 fracture, hypertension, overactive bladder/UTI- arrange for SNF transfer today Clementina Welch

## 2023-10-11 NOTE — OB RN TRIAGE NOTE - FALL HARM RISK - UNIVERSAL INTERVENTIONS
Bed in lowest position, wheels locked, appropriate side rails in place/Call bell, personal items and telephone in reach/Instruct patient to call for assistance before getting out of bed or chair/Non-slip footwear when patient is out of bed/Hay to call system/Physically safe environment - no spills, clutter or unnecessary equipment/Purposeful Proactive Rounding/Room/bathroom lighting operational, light cord in reach

## 2023-10-11 NOTE — OB RN PATIENT PROFILE - NS_GESTAGE_OBGYN_ALL_OB_FT
"[facility] [[id number]]  Chief Complaint   Patient presents with     Consult     Vascular malformation     Initial Ht 5' 4.76\" (164.5 cm)   Wt 112 lb 14 oz (51.2 kg)   BMI 18.92 kg/m   Estimated body mass index is 18.92 kg/m  as calculated from the following:    Height as of this encounter: 5' 4.76\" (164.5 cm).    Weight as of this encounter: 112 lb 14 oz (51.2 kg).  Medication Reconciliation: complete    Blayne Meng, EMT        "
38w2d

## 2023-10-11 NOTE — OB NEONATOLOGY/PEDIATRICIAN DELIVERY SUMMARY - NSPEDSNEONOTESA_OBGYN_ALL_OB_FT
Peds called to OR for category II tracing. 38.2 wk AGA male born via CS to a 31 y/o  mother.  Maternal history of GDMA2. Maternal labs include Blood Type B+, HIV - , RPR NR , Rubella equivocal, Hep B - , GBS - (10/5). SROM at 0108 on 10/11 with clear fluids (ROM hours: 5H 44M).  Baby emerged vigorous, crying, was w/d/s/s with APGARS of 9/9. Nuchal x 2. True knot x 1. Resuscitation included: tactile stimulation, bulb suction, and deep suction. Mom plans to initiate breastfeeding and formula feeding, declines Hep B vaccine and consents circ.  Highest maternal temp: 36.9. EOS 0.11    : 10/11  TOB: 0652  Weight: 3310 g    Physical Exam:  Gen: no acute distress, +grimace  HEENT:  anterior fontanel open soft and flat, nondysmorphic facies,, ears normal set, no ear pits or tags, nares clinically patent  Resp: Normal respiratory effort without grunting or retractions  Cardio: Present S1/S2, regular rate and rhythm  Abd: soft, non tender, non distended, umbilical cord with 3 vessels  Neuro: +palmar and plantar grasp, +edwar, normal tone  Extremities: moving all extremities  Skin: pink, warm  Genitals: Normal male anatomy, Adrián 1, anus patent

## 2023-10-11 NOTE — OB PROVIDER H&P - ASSESSMENT
Evidence of rupture of membranes, discussed findings with Dr. Sharma   huddle held with Dr. Sharma, Dr. Keating-Raman, Dr. Kwan, anesthesia, and charge RN   -Admit to L&D  -Routine labs ordered   -For repeat

## 2023-10-11 NOTE — OB RN DELIVERY SUMMARY - NS_PROPHYLABX_OBGYN_ALL_OB
Concepcion Cantor MD - Attending Physician: I have personally seen and examined this patient with the resident/fellow.  I have fully participated in the care of this patient. I have reviewed all pertinent clinical information, including history, physical exam, plan and the Resident/Fellow’s note and agree except as noted. See MDM Yes

## 2023-10-11 NOTE — OB PROVIDER DELIVERY SUMMARY - NSLOWPPHRISK_OBGYN_A_OB
[place] Pregnancy/Less than or equal to 4 previous vaginal births/No known bleeding disorder/No history of postpartum hemorrhage

## 2023-10-11 NOTE — OB RN INTRAOPERATIVE NOTE - NSSELHIDDEN_OBGYN_ALL_OB_FT
[NS_DeliveryAttending1_OBGYN_ALL_OB_FT:[id number]=],[NS_DeliveryRN_OBGYN_ALL_OB_FT:[id number]=]

## 2023-10-11 NOTE — OB PROVIDER DELIVERY SUMMARY - NSSELHIDDEN_OBGYN_ALL_OB_FT
[NS_DeliveryAttending1_OBGYN_ALL_OB_FT:[id number]=],[NS_DeliveryRN_OBGYN_ALL_OB_FT:[id number]=]

## 2023-10-11 NOTE — OB RN DELIVERY SUMMARY - NSSELHIDDEN_OBGYN_ALL_OB_FT
[NS_DeliveryAttending1_OBGYN_ALL_OB_FT:[id number]=],[NS_DeliveryRN_OBGYN_ALL_OB_FT:[id number]=]

## 2023-10-11 NOTE — OB PROVIDER DELIVERY SUMMARY - NSPROVIDERDELIVERYNOTE_OBGYN_ALL_OB_FT
repeat LTCS at 38w2d for PROM, uncomplicated  viable male infant, vertex presentation, weight 3310g, Apgars 9/9, cord gasses sent  Grossly normal fallopian tubes, uterus, and ovaries    EBL: 76   IVF: 1400  UOP: 400    Dictation #_______    [facility] PGY2  W/ Dr Sharma repeat LTCS at 38w2d for PROM, uncomplicated  viable male infant, vertex presentation, weight 3310g, Apgars 9/9, cord gasses sent  Grossly normal fallopian tubes, uterus, and ovaries    EBL: 76   IVF: 1400  UOP: 400    Dictation #[id number]    [facility] PGY2  W/ Dr Sharma

## 2023-10-11 NOTE — OB RN DELIVERY SUMMARY - NS_SEPSISRSKCALC_OBGYN_ALL_OB_FT
Rupture of membranes must be entered above.   EOS calculated successfully. EOS Risk Factor: 0.5/1000 live births ([facility] national incidence); GA=38w2d; Temp=99; ROM=5.733; GBS='Negative'; Antibiotics='No antibiotics or any antibiotics < 2 hrs prior to birth'

## 2023-10-11 NOTE — OB PROVIDER LABOR PROGRESS NOTE - ASSESSMENT
- SVE unchanged and patient denies any pain between ctx, low suspicion for uterine rupture  - plan to wait for NPO status at 6a    Agnieszka Kwan, PGY-4

## 2023-10-12 ENCOUNTER — TRANSCRIPTION ENCOUNTER (OUTPATIENT)
Age: 28
End: 2023-10-12

## 2023-10-12 LAB
BASOPHILS # BLD AUTO: 0.03 K/UL — SIGNIFICANT CHANGE UP (ref 0–0.2)
BASOPHILS NFR BLD AUTO: 0.2 % — SIGNIFICANT CHANGE UP (ref 0–2)
EOSINOPHIL # BLD AUTO: 0.08 K/UL — SIGNIFICANT CHANGE UP (ref 0–0.5)
EOSINOPHIL NFR BLD AUTO: 0.6 % — SIGNIFICANT CHANGE UP (ref 0–6)
HCT VFR BLD CALC: 34.9 % — SIGNIFICANT CHANGE UP (ref 34.5–45)
HGB BLD-MCNC: 11.4 G/DL — LOW (ref 11.5–15.5)
IANC: 9.91 K/UL — HIGH (ref 1.8–7.4)
IMM GRANULOCYTES NFR BLD AUTO: 0.3 % — SIGNIFICANT CHANGE UP (ref 0–0.9)
LYMPHOCYTES # BLD AUTO: 22.3 % — SIGNIFICANT CHANGE UP (ref 13–44)
LYMPHOCYTES # BLD AUTO: 3.09 K/UL — SIGNIFICANT CHANGE UP (ref 1–3.3)
MCHC RBC-ENTMCNC: 28.4 PG — SIGNIFICANT CHANGE UP (ref 27–34)
MCHC RBC-ENTMCNC: 32.7 GM/DL — SIGNIFICANT CHANGE UP (ref 32–36)
MCV RBC AUTO: 87 FL — SIGNIFICANT CHANGE UP (ref 80–100)
MONOCYTES # BLD AUTO: 0.7 K/UL — SIGNIFICANT CHANGE UP (ref 0–0.9)
MONOCYTES NFR BLD AUTO: 5.1 % — SIGNIFICANT CHANGE UP (ref 2–14)
NEUTROPHILS # BLD AUTO: 9.91 K/UL — HIGH (ref 1.8–7.4)
NEUTROPHILS NFR BLD AUTO: 71.5 % — SIGNIFICANT CHANGE UP (ref 43–77)
NRBC # BLD: 0 /100 WBCS — SIGNIFICANT CHANGE UP (ref 0–0)
NRBC # FLD: 0 K/UL — SIGNIFICANT CHANGE UP (ref 0–0)
PLATELET # BLD AUTO: 230 K/UL — SIGNIFICANT CHANGE UP (ref 150–400)
RBC # BLD: 4.01 M/UL — SIGNIFICANT CHANGE UP (ref 3.8–5.2)
RBC # FLD: 16.8 % — HIGH (ref 10.3–14.5)
WBC # BLD: 13.85 K/UL — HIGH (ref 3.8–10.5)
WBC # FLD AUTO: 13.85 K/UL — HIGH (ref 3.8–10.5)

## 2023-10-12 RX ORDER — ACETAMINOPHEN 500 MG
2 TABLET ORAL
Qty: 0 | Refills: 0 | DISCHARGE

## 2023-10-12 RX ORDER — FERROUS GLUCONATE 100 %
1 POWDER (GRAM) MISCELLANEOUS
Refills: 0 | DISCHARGE

## 2023-10-12 RX ORDER — IBUPROFEN 200 MG
600 TABLET ORAL EVERY 6 HOURS
Refills: 0 | Status: DISCONTINUED | OUTPATIENT
Start: 2023-10-12 | End: 2023-10-14

## 2023-10-12 RX ORDER — HUMAN INSULIN 100 [IU]/ML
12 INJECTION, SUSPENSION SUBCUTANEOUS
Refills: 0 | DISCHARGE

## 2023-10-12 RX ORDER — OXYCODONE HYDROCHLORIDE 5 MG/1
5 TABLET ORAL
Refills: 0 | Status: DISCONTINUED | OUTPATIENT
Start: 2023-10-12 | End: 2023-10-14

## 2023-10-12 RX ORDER — OXYCODONE HYDROCHLORIDE 5 MG/1
5 TABLET ORAL ONCE
Refills: 0 | Status: DISCONTINUED | OUTPATIENT
Start: 2023-10-12 | End: 2023-10-12

## 2023-10-12 RX ORDER — FERROUS SULFATE 325(65) MG
325 TABLET ORAL DAILY
Refills: 0 | Status: DISCONTINUED | OUTPATIENT
Start: 2023-10-12 | End: 2023-10-14

## 2023-10-12 RX ORDER — SENNA PLUS 8.6 MG/1
2 TABLET ORAL AT BEDTIME
Refills: 0 | Status: DISCONTINUED | OUTPATIENT
Start: 2023-10-12 | End: 2023-10-14

## 2023-10-12 RX ADMIN — Medication 975 MILLIGRAM(S): at 16:30

## 2023-10-12 RX ADMIN — SIMETHICONE 80 MILLIGRAM(S): 80 TABLET, CHEWABLE ORAL at 21:23

## 2023-10-12 RX ADMIN — Medication 975 MILLIGRAM(S): at 10:30

## 2023-10-12 RX ADMIN — HEPARIN SODIUM 5000 UNIT(S): 5000 INJECTION INTRAVENOUS; SUBCUTANEOUS at 18:19

## 2023-10-12 RX ADMIN — Medication 975 MILLIGRAM(S): at 22:15

## 2023-10-12 RX ADMIN — Medication 975 MILLIGRAM(S): at 16:00

## 2023-10-12 RX ADMIN — Medication 600 MILLIGRAM(S): at 18:40

## 2023-10-12 RX ADMIN — Medication 600 MILLIGRAM(S): at 13:01

## 2023-10-12 RX ADMIN — Medication 600 MILLIGRAM(S): at 13:30

## 2023-10-12 RX ADMIN — Medication 30 MILLIGRAM(S): at 00:43

## 2023-10-12 RX ADMIN — Medication 975 MILLIGRAM(S): at 11:00

## 2023-10-12 RX ADMIN — SENNA PLUS 2 TABLET(S): 8.6 TABLET ORAL at 21:23

## 2023-10-12 RX ADMIN — Medication 975 MILLIGRAM(S): at 21:22

## 2023-10-12 RX ADMIN — Medication 600 MILLIGRAM(S): at 18:20

## 2023-10-12 RX ADMIN — Medication 1 TABLET(S): at 13:01

## 2023-10-12 RX ADMIN — Medication 30 MILLIGRAM(S): at 01:05

## 2023-10-12 RX ADMIN — OXYCODONE HYDROCHLORIDE 5 MILLIGRAM(S): 5 TABLET ORAL at 16:00

## 2023-10-12 RX ADMIN — Medication 30 MILLIGRAM(S): at 06:14

## 2023-10-12 RX ADMIN — Medication 325 MILLIGRAM(S): at 13:01

## 2023-10-12 NOTE — DISCHARGE NOTE OB - HOSPITAL COURSE
Patient presented at 38+ weeks with PROM and prior c/section, refusing to TOLAC  Had uncomplicated repeat  section

## 2023-10-12 NOTE — DISCHARGE NOTE OB - PLAN OF CARE
pelvic rest x 6 weeks After discharge, please stay on pelvic rest for 6 weeks, meaning no sexual intercourse, no tampons and no douching.  No driving for 2 weeks as women can loose a lot of blood during delivery and there is a possibility of being lightheaded/fainting.  No lifting objects heavier than baby for two weeks.  Expect to have vaginal bleeding/spotting for up to six weeks.  The bleeding should get lighter and more white/light brown with time.  For bleeding soaking more than a pad an hour or passing clots greater than the size of your fist, come in to the emergency department.    Follow up in OB office in 1-2 weeks for incision check.  Call for noticeable increase in redness or swelling at incision, discharge from incision, or opening of skin at incision site

## 2023-10-12 NOTE — PROGRESS NOTE ADULT - ASSESSMENT
[facility] Vital signs are stable and physical exam is unremarkable.  - Increase ambulation  - Continue regular diet  - H/H: 13.2/38.9->11.4/34.9, stable    KIRA Nieto, PGY-1

## 2023-10-12 NOTE — DISCHARGE NOTE OB - CARE PROVIDER_API CALL
Rocio Guy  Obstetrics and Gynecology  [address] [place] [address]  Phone: [phone]  Fax: [phone]  Follow Up Time:

## 2023-10-12 NOTE — DISCHARGE NOTE OB - CARE PLAN
1 Principal Discharge DX:	Premature rupture of membranes (PROM), delivered  Assessment and plan of treatment:	pelvic rest x 6 weeks   Principal Discharge DX:	Premature rupture of membranes (PROM), delivered  Assessment and plan of treatment:	pelvic rest x 6 weeks  Secondary Diagnosis:	S/P  section  Assessment and plan of treatment:	After discharge, please stay on pelvic rest for 6 weeks, meaning no sexual intercourse, no tampons and no douching.  No driving for 2 weeks as women can loose a lot of blood during delivery and there is a possibility of being lightheaded/fainting.  No lifting objects heavier than baby for two weeks.  Expect to have vaginal bleeding/spotting for up to six weeks.  The bleeding should get lighter and more white/light brown with time.  For bleeding soaking more than a pad an hour or passing clots greater than the size of your fist, come in to the emergency department.    Follow up in OB office in 1-2 weeks for incision check.  Call for noticeable increase in redness or swelling at incision, discharge from incision, or opening of skin at incision site

## 2023-10-12 NOTE — DISCHARGE NOTE OB - PATIENT PORTAL LINK FT
You can access the FollowMyHealth Patient Portal offered by [facility] by registering at the following website: [email or web address]. By joining [employer]’s FollowMyHealth portal, you will also be able to view your health information using other applications (apps) compatible with our system.

## 2023-10-12 NOTE — PROGRESS NOTE ADULT - REASON FOR ADMISSION
TRANSFER - OUT REPORT:    Verbal report given to ORI Villalta(name) on Mikaela Pitt Wilton  being transferred to CVICU(unit) for routine post - op       Report consisted of patients Situation, Background, Assessment and   Recommendations(SBAR). Information from the following report(s) Procedure Summary was reviewed with the receiving nurse. Lines:   Peripheral IV 03/28/21 Right Forearm (Active)   Site Assessment Clean, dry, & intact 03/28/21 0410   Phlebitis Assessment 0 03/28/21 0410   Infiltration Assessment 0 03/28/21 0410   Dressing Status Clean, dry, & intact 03/28/21 0410   Dressing Type Transparent 03/28/21 0410        Opportunity for questions and clarification was provided.       Patient transported with:   Registered Nurse s/p repeat c/s

## 2023-10-12 NOTE — DISCHARGE NOTE OB - NPI NUMBER (FOR SYSADMIN USE ONLY) :
Chief Complaint:   Other (C/o blisters in throat and mouth and starting to go into ears, feels like swollen glass, fatigue,fever,   onset thursday)      History of Present Illness   HPI:  Samy Yun is a 43 y.o. male who [place] [facility] [place] like [place], children with documented Influeza A. Prior to Visit Medications    Medication Sig Taking? Authorizing Provider   Ascorbic Acid (VITAMIN C PO) Take by mouth Yes Historical Provider, MD   ZINC PO Take by mouth Yes Historical Provider, MD   Multiple Vitamin (MULTIVITAMIN ADULT PO) Take by mouth Yes Historical Provider, MD   PARoxetine (PAXIL) 10 MG tablet Take 1 tablet by mouth every morning Yes Sp Winkler, DO   omeprazole (PRILOSEC) 40 MG delayed release capsule Take 1 capsule by mouth daily Yes Sp Winkler, DO       Review of Systems   Review of Systems   Constitutional:  Positive for activity change, appetite change and fatigue. Negative for chills and fever. HENT:  Positive for congestion. Negative for ear pain, sinus pressure and sore throat. Eyes:  Negative for pain, discharge and redness. Respiratory:  Positive for cough. Negative for shortness of breath and wheezing. Cardiovascular:  Negative for chest pain. Gastrointestinal:  Negative for abdominal pain, diarrhea, nausea and vomiting. Genitourinary:  Negative for dysuria and frequency. Musculoskeletal:  Positive for myalgias. Negative for arthralgias and back pain. Skin:  Negative for rash and wound. Neurological:  Negative for weakness and headaches. Hematological:  Negative for adenopathy. Psychiatric/Behavioral: Negative. All other systems reviewed and are negative. Patient's medical, social, and family history reviewed    Past Medical History:  has a past medical history of Compression fracture of L1 lumbar vertebra ([address] Depression, and GERD (gastroesophageal reflux disease).    Past Surgical History:  has a past surgical history that includes Tonsillectomy and Carpal tunnel release. Social History:  reports that he has never smoked. He has never used smokeless tobacco. He reports current alcohol use. Family History: family history is not on file. Allergies: Patient has no known allergies. Physical Exam   Vital Signs:  BP (!) 152/110 (Site: Right Upper Arm, Position: Sitting, Cuff Size: Large Adult)   Pulse 74   Temp 97.8 °F (36.6 °C)   Resp 16   Ht 6' (1.829 m)   Wt 227 lb 9.6 oz (103.2 kg)   SpO2 97%   BMI 30.87 kg/m²    Oxygen Saturation Interpretation: Normal.    Physical Exam  Vitals and nursing note reviewed. Constitutional:       Appearance: He is well-developed. HENT:      Head: Normocephalic and atraumatic. Right Ear: Tympanic membrane normal.      Left Ear: Tympanic membrane normal.      Nose: Congestion and rhinorrhea present. Mouth/Throat:      Pharynx: Posterior oropharyngeal erythema present. Eyes:      Pupils: Pupils are equal, round, and reactive to light. Cardiovascular:      Rate and Rhythm: Normal rate and regular rhythm. Heart sounds: Normal heart sounds. No murmur heard. Pulmonary:      Effort: Pulmonary effort is normal. No respiratory distress. Breath sounds: Normal breath sounds. No wheezing or rales. Abdominal:      General: Bowel sounds are normal.      Palpations: Abdomen is soft. Tenderness: There is no abdominal tenderness. There is no guarding or rebound. Musculoskeletal:      Cervical back: Normal range of motion and neck supple. Skin:     General: Skin is warm and dry. Neurological:      Mental Status: He is alert and oriented to person, place, and time. Cranial Nerves: No cranial nerve deficit. Coordination: Coordination normal.         Test Results Section   (All laboratory and radiology results have been personally reviewed by myself)  Labs:  No results found for this visit on 11/14/22. Imaging:   All Radiology results interpreted by Radiologist unless otherwise noted. No results found. Assessment / Plan   Impression(s):  Rigoberto Osorio was seen today for other. Diagnoses and all orders for this visit:    Acute viral syndrome    Exposure to the flu      Discharged home. Patient condition is good    No follow-ups on file.      New Medications     New Prescriptions    No medications on file       Electronically signed by Austen Lyons DO   DD: 11/14/22 [[id number]]

## 2023-10-12 NOTE — DISCHARGE NOTE OB - MEDICATION SUMMARY - MEDICATIONS TO TAKE
I will START or STAY ON the medications listed below when I get home from the hospital:    Motrin 600 mg oral tablet  -- 1 tab(s) by mouth every 6 hours, As Needed  -- Indication: For PROM (premature rupture of membranes)   I will START or STAY ON the medications listed below when I get home from the hospital:    Motrin 600 mg oral tablet  -- 1 tab(s) by mouth every 6 hours, As Needed  -- Indication: For Pain    acetaminophen 325 mg oral tablet  -- 3 tab(s) by mouth every 6 hours as needed for  mild pain  -- Indication: For Pain

## 2023-10-12 NOTE — DISCHARGE NOTE OB - MEDICATION SUMMARY - MEDICATIONS TO STOP TAKING
I will STOP taking the medications listed below when I get home from the hospital:    Augmentin 875 mg-125 mg oral tablet  -- 875 milligram(s) by mouth 2 times a day   -- Finish all this medication unless otherwise directed by prescriber.  Take with food or milk.

## 2023-10-12 NOTE — DISCHARGE NOTE OB - NS MD DC FALL RISK RISK
For information on Fall & Injury Prevention, visit: [email or web address] OR  [email or web address] OR  https://www.cdc.gov/steadi/patient.html

## 2023-10-13 ENCOUNTER — APPOINTMENT (OUTPATIENT)
Dept: MATERNAL FETAL MEDICINE | Facility: CLINIC | Age: 28
End: 2023-10-13

## 2023-10-13 RX ORDER — ACETAMINOPHEN 500 MG
3 TABLET ORAL
Qty: 0 | Refills: 0 | DISCHARGE
Start: 2023-10-13

## 2023-10-13 RX ADMIN — Medication 600 MILLIGRAM(S): at 12:21

## 2023-10-13 RX ADMIN — SIMETHICONE 80 MILLIGRAM(S): 80 TABLET, CHEWABLE ORAL at 03:11

## 2023-10-13 RX ADMIN — Medication 975 MILLIGRAM(S): at 09:28

## 2023-10-13 RX ADMIN — Medication 975 MILLIGRAM(S): at 21:05

## 2023-10-13 RX ADMIN — Medication 600 MILLIGRAM(S): at 00:32

## 2023-10-13 RX ADMIN — Medication 975 MILLIGRAM(S): at 03:57

## 2023-10-13 RX ADMIN — HEPARIN SODIUM 5000 UNIT(S): 5000 INJECTION INTRAVENOUS; SUBCUTANEOUS at 18:46

## 2023-10-13 RX ADMIN — Medication 1 TABLET(S): at 12:21

## 2023-10-13 RX ADMIN — SIMETHICONE 80 MILLIGRAM(S): 80 TABLET, CHEWABLE ORAL at 10:58

## 2023-10-13 RX ADMIN — MAGNESIUM HYDROXIDE 30 MILLILITER(S): 400 TABLET, CHEWABLE ORAL at 00:33

## 2023-10-13 RX ADMIN — Medication 975 MILLIGRAM(S): at 10:03

## 2023-10-13 RX ADMIN — HEPARIN SODIUM 5000 UNIT(S): 5000 INJECTION INTRAVENOUS; SUBCUTANEOUS at 06:23

## 2023-10-13 RX ADMIN — Medication 600 MILLIGRAM(S): at 07:31

## 2023-10-13 RX ADMIN — Medication 600 MILLIGRAM(S): at 18:46

## 2023-10-13 RX ADMIN — Medication 975 MILLIGRAM(S): at 03:09

## 2023-10-13 RX ADMIN — Medication 600 MILLIGRAM(S): at 01:15

## 2023-10-13 RX ADMIN — Medication 975 MILLIGRAM(S): at 16:37

## 2023-10-13 RX ADMIN — Medication 600 MILLIGRAM(S): at 23:51

## 2023-10-13 RX ADMIN — Medication 600 MILLIGRAM(S): at 13:08

## 2023-10-13 RX ADMIN — Medication 975 MILLIGRAM(S): at 16:02

## 2023-10-13 RX ADMIN — Medication 975 MILLIGRAM(S): at 20:36

## 2023-10-13 RX ADMIN — Medication 325 MILLIGRAM(S): at 12:21

## 2023-10-13 RX ADMIN — Medication 600 MILLIGRAM(S): at 06:22

## 2023-10-13 RX ADMIN — SIMETHICONE 80 MILLIGRAM(S): 80 TABLET, CHEWABLE ORAL at 20:36

## 2023-10-14 VITALS
TEMPERATURE: 99 F | OXYGEN SATURATION: 100 % | HEART RATE: 76 BPM | DIASTOLIC BLOOD PRESSURE: 88 MMHG | SYSTOLIC BLOOD PRESSURE: 132 MMHG | RESPIRATION RATE: 17 BRPM

## 2023-10-14 RX ADMIN — Medication 600 MILLIGRAM(S): at 00:00

## 2023-10-14 RX ADMIN — Medication 600 MILLIGRAM(S): at 12:28

## 2023-10-14 RX ADMIN — HEPARIN SODIUM 5000 UNIT(S): 5000 INJECTION INTRAVENOUS; SUBCUTANEOUS at 06:23

## 2023-10-14 RX ADMIN — Medication 600 MILLIGRAM(S): at 00:30

## 2023-10-14 RX ADMIN — Medication 975 MILLIGRAM(S): at 02:53

## 2023-10-14 RX ADMIN — Medication 325 MILLIGRAM(S): at 12:28

## 2023-10-14 RX ADMIN — Medication 975 MILLIGRAM(S): at 03:23

## 2023-10-14 RX ADMIN — Medication 975 MILLIGRAM(S): at 09:02

## 2023-10-14 RX ADMIN — Medication 600 MILLIGRAM(S): at 06:24

## 2023-10-14 RX ADMIN — Medication 1 TABLET(S): at 12:28

## 2023-10-14 RX ADMIN — Medication 975 MILLIGRAM(S): at 08:30

## 2023-10-14 NOTE — PROGRESS NOTE ADULT - ASSESSMENT
27y/o  POD#3 from rLTCS. QBL 76. Overall, patient stable and recovering well postoperatively.    #Postpartum state  - Continue with po analgesia  - Increase ambulation  - Continue regular diet  - H/H: 13.2/38.9->11.4/34.9, stable  - DVT prophylaxis with 5000u Heparin    Elsy Valle  PGY-1

## 2023-10-14 NOTE — PROGRESS NOTE ADULT - SUBJECTIVE AND OBJECTIVE BOX
INTERVAL HPI/OVERNIGHT EVENTS:  28y Female s/p c section under spinal anesthesia with duramorph for post op analgesia on     Vital Signs Last 24 Hrs  T(C): 36.8 (12 Oct 2023 06:19), Max: 36.8 (12 Oct 2023 06:19)  T(F): 98.2 (12 Oct 2023 06:19), Max: 98.2 (12 Oct 2023 06:19)  HR: 81 (12 Oct 2023 06:19) (70 - 98)  BP: 107/73 (12 Oct 2023 06:19) (107/73 - 124/70)  BP(mean): 79 (11 Oct 2023 12:00) (74 - 81)  RR: 19 (12 Oct 2023 06:19) (17 - 21)  SpO2: 100% (12 Oct 2023 06:19) (98% - 100%)    Parameters below as of 12 Oct 2023 06:19  Patient On (Oxygen Delivery Method): room air            Patient's overall anesthesia satisfaction: Positive    Patients pain is well controlled with IT duramorph    No respiratory events overnight    No pruritis at this time    Patient doing well     No headache      No residual numbness or weakness, sensory and motor function intact.    No anesthetic complications or complaints noted or reported          .            
[facility] Patient seen and examined at bedside, no acute overnight events. No acute complaints, pain well controlled. Patient is ambulating and tolerating regular diet. Passing flatus, voiding. Denies CP, SOB, N/V, HA, blurred vision, epigastric pain.    Vital Signs Last 24 Hours  T(C): 36.8 (10-12-23 @ 06:19), Max: 36.8 (10-12-23 @ 06:19)  HR: 81 (10-12-23 @ 06:19) (70 - 98)  BP: 107/73 (10-12-23 @ 06:19) (103/59 - 124/70)  RR: 19 (10-12-23 @ 06:19) (15 - 21)  SpO2: 100% (10-12-23 @ 06:19) (97% - 100%)    I&O's Summary    11 Oct 2023 07:01  -  12 Oct 2023 07:00  --------------------------------------------------------  IN: 2500 mL / OUT: 2021 mL / NET: 479 mL        Physical Exam:  General: NAD  Abdomen: Soft, non-tender, non-distended, fundus firm  Incision: Pfannenstiel incision CDI, subcuticular suture closure  Pelvic: Lochia wnl    Labs:    Blood Type: B Positive  Antibody Screen: Negative  RPR: Negative               11.4   13.85 )-----------( 230      ( 10-12 @ 06:20 )             34.9                13.2   9.79  )-----------( 293      ( 10-11 @ 03:00 )             38.9         MEDICATIONS  (STANDING):  acetaminophen     Tablet .. 975 milliGRAM(s) Oral <User Schedule>  diphtheria/tetanus/pertussis (acellular) Vaccine (Adacel) 0.5 milliLiter(s) IntraMuscular once  ferrous    sulfate 325 milliGRAM(s) Oral daily  heparin   Injectable 5000 Unit(s) SubCutaneous every 12 hours  ibuprofen  Tablet. 600 milliGRAM(s) Oral every 6 hours  influenza   Vaccine 0.5 milliLiter(s) IntraMuscular once  ketorolac   Injectable 30 milliGRAM(s) IV Push every 6 hours  lactated ringers Bolus 1000 milliLiter(s) IV Bolus once  lactated ringers Bolus 500 milliLiter(s) IV Bolus once  lactated ringers Bolus 500 milliLiter(s) IV Bolus once  lactated ringers Bolus 1000 milliLiter(s) IV Bolus once  lactated ringers Bolus 500 milliLiter(s) IV Bolus once  lactated ringers Bolus 1000 milliLiter(s) IV Bolus once  lactated ringers. 1000 milliLiter(s) (125 mL/Hr) IV Continuous <Continuous>  lactated ringers. 1000 milliLiter(s) (75 mL/Hr) IV Continuous <Continuous>  prenatal multivitamin 1 Tablet(s) Oral daily  senna 2 Tablet(s) Oral at bedtime    MEDICATIONS  (PRN):  diphenhydrAMINE 25 milliGRAM(s) Oral every 6 hours PRN Pruritus  lanolin Ointment 1 Application(s) Topical every 6 hours PRN Sore Nipples  magnesium hydroxide Suspension 30 milliLiter(s) Oral two times a day PRN Constipation  oxyCODONE    IR 5 milliGRAM(s) Oral every 3 hours PRN Moderate to Severe Pain (4-10)  oxyCODONE    IR 5 milliGRAM(s) Oral once PRN Moderate to Severe Pain (4-10)  simethicone 80 milliGRAM(s) Chew every 4 hours PRN Gas  
OB Progress Note: pTLCS, POD#2    S: 29yo now  POD#2 s/p pLTCS QBl 76 ml. Pt seen and examine at bedside. No acute events overnight.  Pain is well controlled. She is tolerating a regular diet and passing flatus/ + Bm. She is voiding spontaneously, and ambulating without difficulty. Denies CP/SOB. Denies lightheadedness/dizziness. Denies N/V.    O:  Vitals:  Vital Signs Last 24 Hrs  T(C): 36.6 (13 Oct 2023 06:18), Max: 37 (12 Oct 2023 14:02)  T(F): 97.8 (13 Oct 2023 06:18), Max: 98.6 (12 Oct 2023 14:02)  HR: 78 (13 Oct 2023 06:18) (78 - 82)  BP: 111/62 (13 Oct 2023 06:18) (111/62 - 127/78)  BP(mean): --  RR: 19 (13 Oct 2023 06:18) (18 - 19)  SpO2: 99% (13 Oct 2023 06:18) (99% - 100%)    Parameters below as of 13 Oct 2023 06:18  Patient On (Oxygen Delivery Method): room air        MEDICATIONS  (STANDING):  acetaminophen     Tablet .. 975 milliGRAM(s) Oral <User Schedule>  diphtheria/tetanus/pertussis (acellular) Vaccine (Adacel) 0.5 milliLiter(s) IntraMuscular once  ferrous    sulfate 325 milliGRAM(s) Oral daily  heparin   Injectable 5000 Unit(s) SubCutaneous every 12 hours  ibuprofen  Tablet. 600 milliGRAM(s) Oral every 6 hours  influenza   Vaccine 0.5 milliLiter(s) IntraMuscular once  lactated ringers Bolus 1000 milliLiter(s) IV Bolus once  lactated ringers Bolus 500 milliLiter(s) IV Bolus once  prenatal multivitamin 1 Tablet(s) Oral daily  senna 2 Tablet(s) Oral at bedtime      MEDICATIONS  (PRN):  diphenhydrAMINE 25 milliGRAM(s) Oral every 6 hours PRN Pruritus  lanolin Ointment 1 Application(s) Topical every 6 hours PRN Sore Nipples  magnesium hydroxide Suspension 30 milliLiter(s) Oral two times a day PRN Constipation  oxyCODONE    IR 5 milliGRAM(s) Oral every 3 hours PRN Moderate to Severe Pain (4-10)  oxyCODONE    IR 5 milliGRAM(s) Oral once PRN Moderate to Severe Pain (4-10)  simethicone 80 milliGRAM(s) Chew every 4 hours PRN Gas      Labs:  Blood type: B Positive  Rubella IgG: RPR: Negative                          11.4<L>   13.85<H> >-----------< 230    ( 10-12 @ 06:20 )             34.9                        13.2   9.79 >-----------< 293    ( 10-11 @ 03:00 )             38.9            PE:  General: NAD  Lungs: CTA b/l good airway entry  CVS: RRR S1S2  Abdomen: Soft, appropriately tender, incision w/ Dermabond c/d/i.  Extremities: No erythema, trace edema, pedal pulse 2+ bilateral    A/P: 29yo now  POD#2 s/p pLTCS  QBL76 ml.  Patient is stable and doing well post-operatively.      Routine PP care  - Continue Routine Postop/ PP care  - Continue regular diet.  - Increase ambulation.  - Continue Motrin, Tylenol, oxycodone PRN for pain control.  - Reviewed PEC precaution in detail pt verbalized understanding  - Discharge planning today  - F/u in MD office 1-2 wks incision check    GDMA2  - f/u in 6 wks for repeat GTT        JOSEFA Mcgovern-BC
Patient seen and examined at bedside, resting comfortably in no acute distress. Denies fever, chills, nausea or vomiting. She is tolerating a regular diet. She is ambulating without difficulty and voiding spontaneously. Pain is well controlled. Bleeding is less than a normal menses. Reports passing gas and has had a bowel movement.    Physical Examination:  Vital Signs: Vital Signs Last 24 Hrs  T(C): 36.6 (13 Oct 2023 06:18), Max: 37 (12 Oct 2023 14:02)  T(F): 97.8 (13 Oct 2023 06:18), Max: 98.6 (12 Oct 2023 14:02)  HR: 78 (13 Oct 2023 06:18) (78 - 82)  BP: 111/62 (13 Oct 2023 06:18) (111/62 - 127/78)  BP(mean): --  RR: 19 (13 Oct 2023 06:18) (18 - 19)  SpO2: 99% (13 Oct 2023 06:18) (99% - 100%)    Parameters below as of 13 Oct 2023 06:18  Patient On (Oxygen Delivery Method): room air      General: alert and oriented, no acute distress  Cardio: regular rate and rhythm  Respiratory: non labored respirations  Abdomen: soft, non-tender, non-distended; bowel sounds present; uterus firm, palpated below the umbilicus; incision clean, dry and intact, surrounding skin non erythematous  VE: minimal lochia noted  Musculoskeletal: No erythema/edema, no calf tenderness bilaterally    MEDICATIONS  (STANDING):  acetaminophen     Tablet .. 975 milliGRAM(s) Oral <User Schedule>  diphtheria/tetanus/pertussis (acellular) Vaccine (Adacel) 0.5 milliLiter(s) IntraMuscular once  ferrous    sulfate 325 milliGRAM(s) Oral daily  heparin   Injectable 5000 Unit(s) SubCutaneous every 12 hours  ibuprofen  Tablet. 600 milliGRAM(s) Oral every 6 hours  influenza   Vaccine 0.5 milliLiter(s) IntraMuscular once  lactated ringers Bolus 1000 milliLiter(s) IV Bolus once  lactated ringers Bolus 500 milliLiter(s) IV Bolus once  prenatal multivitamin 1 Tablet(s) Oral daily  senna 2 Tablet(s) Oral at bedtime      Labs:  Blood type: B Positive  Rubella IgG: RPR: Negative                          11.4<L>   13.85<H> >-----------< 230    ( 10-12 @ 06:20 )             34.9                        13.2   9.79 >-----------< 293    ( 10-11 @ 03:00 )             38.9      Assessment and Plan:   27yo s/p rLTCS on 10/11 now POD#2.  Patient is stable and doing well post-partum.   Blood type: B+    Plan:  - VS per unit protocol  - SCDs for DVT ppx  - Regular diet  - Pain well controlled, continue current pain regimen  - Encourage ambulation  - Continue wound care  - Discharge instructions reviewed  - D/c planning initiated, patient to follow up in office in 6 weeks for post partum visit    Héctor Jin MD
pt seen and evaluated by me, POD # 1  doing well,  continue routine post op care 
R1 Progress Note    Patient seen and examined at bedside, no acute overnight events. No acute complaints, pain well controlled. Patient is ambulating and tolerating regular diet. Has passed flatus. Patient voiding independently. Denies CP, SOB, N/V, HA, blurred vision. Bleeding minimal.    Vital Signs Last 24 Hours  T(C): 36.8 (10-14-23 @ 05:19), Max: 37 (10-13-23 @ 13:51)  HR: 71 (10-14-23 @ 05:19) (71 - 87)  BP: 116/77 (10-14-23 @ 05:19) (113/82 - 122/75)  RR: 19 (10-14-23 @ 05:19) (17 - 19)  SpO2: 99% (10-14-23 @ 05:19) (99% - 100%)    I&O's Summary      Physical Exam:  General: NAD  Abdomen: Soft, non-tender, non-distended, fundus firm  Incision: Pfannenstiel incision CDI, subcuticular suture closure  Pelvic: Lochia wnl    Labs:    Blood Type: B Positive  Antibody Screen: Negative  RPR: Negative               11.4   13.85 )-----------( 230      ( 10-12 @ 06:20 )             34.9                13.2   9.79  )-----------( 293      ( 10-11 @ 03:00 )             38.9         MEDICATIONS  (STANDING):  acetaminophen     Tablet .. 975 milliGRAM(s) Oral <User Schedule>  diphtheria/tetanus/pertussis (acellular) Vaccine (Adacel) 0.5 milliLiter(s) IntraMuscular once  ferrous    sulfate 325 milliGRAM(s) Oral daily  heparin   Injectable 5000 Unit(s) SubCutaneous every 12 hours  ibuprofen  Tablet. 600 milliGRAM(s) Oral every 6 hours  influenza   Vaccine 0.5 milliLiter(s) IntraMuscular once  lactated ringers Bolus 1000 milliLiter(s) IV Bolus once  lactated ringers Bolus 500 milliLiter(s) IV Bolus once  prenatal multivitamin 1 Tablet(s) Oral daily  senna 2 Tablet(s) Oral at bedtime    MEDICATIONS  (PRN):  diphenhydrAMINE 25 milliGRAM(s) Oral every 6 hours PRN Pruritus  lanolin Ointment 1 Application(s) Topical every 6 hours PRN Sore Nipples  magnesium hydroxide Suspension 30 milliLiter(s) Oral two times a day PRN Constipation  oxyCODONE    IR 5 milliGRAM(s) Oral once PRN Moderate to Severe Pain (4-10)  oxyCODONE    IR 5 milliGRAM(s) Oral every 3 hours PRN Moderate to Severe Pain (4-10)  simethicone 80 milliGRAM(s) Chew every 4 hours PRN Gas

## 2023-10-17 ENCOUNTER — APPOINTMENT (OUTPATIENT)
Dept: OBGYN | Facility: CLINIC | Age: 28
End: 2023-10-17

## 2023-10-19 ENCOUNTER — APPOINTMENT (OUTPATIENT)
Dept: ANTEPARTUM | Facility: CLINIC | Age: 28
End: 2023-10-19

## 2023-10-20 ENCOUNTER — APPOINTMENT (OUTPATIENT)
Dept: OBGYN | Facility: HOSPITAL | Age: 28
End: 2023-10-20

## 2023-11-13 ENCOUNTER — TRANSCRIPTION ENCOUNTER (OUTPATIENT)
Age: 28
End: 2023-11-13

## 2023-11-28 ENCOUNTER — APPOINTMENT (OUTPATIENT)
Dept: OBGYN | Facility: CLINIC | Age: 28
End: 2023-11-28
Payer: MEDICAID

## 2023-11-28 VITALS
BODY MASS INDEX: 29.23 KG/M2 | HEART RATE: 92 BPM | DIASTOLIC BLOOD PRESSURE: 80 MMHG | HEIGHT: 59 IN | SYSTOLIC BLOOD PRESSURE: 115 MMHG | WEIGHT: 145 LBS

## 2023-11-28 PROCEDURE — 96160 PT-FOCUSED HLTH RISK ASSMT: CPT

## 2023-11-28 PROCEDURE — 0503F POSTPARTUM CARE VISIT: CPT

## 2023-12-06 ENCOUNTER — APPOINTMENT (OUTPATIENT)
Dept: OBGYN | Facility: CLINIC | Age: 28
End: 2023-12-06

## 2023-12-28 NOTE — HISTORY OF PRESENT ILLNESS
[Rhogam] : Rhogam was not administered [BTL] : no tubal ligation [Resumed Menses] : has not resumed her menses [Resumed [place]] : has not resumed intercourse [Chills] : no chills [Fever] : no fever [Healed] : healed [Back to Normal] : is back to normal in size [Examination Of The Breasts] : breasts are normal [Soft] : soft [Tender] : non tender [Distended] : not distended [de-identified] : Incisional pain and discomfort reported.  [de-identified] : Discussed using moisturizing lotion. Pt advised to monitor incisional discomfort and to RTO if it increases. Pt to start minipill for contraception.

## 2024-01-05 NOTE — ED ADULT TRIAGE NOTE - NS ED NURSE DIRECT TO ROOM YN
Initial shift assessment completed, see complex assessment in doc flowsheet. Patient A&O,B/P 88/51 with a MAP of 60, monitor Afib with a controlled rate. Respirations easy, lungs clear, O2 saturations 97% on on 2 liters NC. Abdomen soft round, BS positive. Rash noted over entire body. Discussed POC, questions answered. Call light within reach, encouraged to call for nurse as needed throughout the shift. Never No

## 2024-05-01 ENCOUNTER — APPOINTMENT (OUTPATIENT)
Dept: OBGYN | Facility: CLINIC | Age: 29
End: 2024-05-01
Payer: MEDICAID

## 2024-05-01 VITALS
WEIGHT: 160 LBS | DIASTOLIC BLOOD PRESSURE: 77 MMHG | HEART RATE: 108 BPM | HEIGHT: 59 IN | BODY MASS INDEX: 32.25 KG/M2 | SYSTOLIC BLOOD PRESSURE: 114 MMHG

## 2024-05-01 DIAGNOSIS — R10.2 PELVIC AND PERINEAL PAIN: ICD-10-CM

## 2024-05-01 DIAGNOSIS — R10.30 LOWER ABDOMINAL PAIN, UNSPECIFIED: ICD-10-CM

## 2024-05-01 DIAGNOSIS — Z01.419 ENCOUNTER FOR GYNECOLOGICAL EXAMINATION (GENERAL) (ROUTINE) W/OUT ABNORMAL FINDINGS: ICD-10-CM

## 2024-05-01 PROCEDURE — 99459 PELVIC EXAMINATION: CPT

## 2024-05-01 PROCEDURE — 96127 BRIEF EMOTIONAL/BEHAV ASSMT: CPT

## 2024-05-01 PROCEDURE — 99395 PREV VISIT EST AGE 18-39: CPT

## 2024-05-01 RX ORDER — DOCONEXENT, NIACINAMIDE, .ALPHA.-TOCOPHEROL ACETATE, DL-, CHOLECALCIFEROL, .BETA.-CAROTENE, ASCORBIC ACID, THIAMINE MONONITRATE, RIBOFLAVIN, PYRIDOXINE HYDROCHLORIDE, CYANOCOBALAMIN, IRON, ZINC OXIDE, CUPRIC OXIDE, POTASSIUM IODIDE, MAGNESIUM OXIDE, FOLIC ACID, AND LEVOMEFOLATE CALCIUM 200; 15; 20; 1000; 1100; 30; 1.6; 1.8; 2.5; 12; 29; 25; 2; 150; 20; .4; .6 MG/1; MG/1; [IU]/1; [IU]/1; [IU]/1; MG/1; MG/1; MG/1; MG/1; UG/1; MG/1; MG/1; MG/1; UG/1; MG/1; MG/1; MG/1
29-0.6-0.4-2 CAPSULE, LIQUID FILLED ORAL
Qty: 90 | Refills: 2 | Status: DISCONTINUED | COMMUNITY
Start: 2023-05-04 | End: 2024-05-01

## 2024-05-01 RX ORDER — LANCETS 28 GAUGE
EACH MISCELLANEOUS
Qty: 4 | Refills: 2 | Status: DISCONTINUED | COMMUNITY
Start: 2023-09-29 | End: 2024-05-01

## 2024-05-01 RX ORDER — CALCIUM CITRATE, IRON PENTACARBONYL, FERROUS FUMARATE, CHOLECALCIFEROL, .ALPHA.-TOCOPHEROL, DL-, PYRIDOXINE HYDROCHLORIDE, FOLIC ACID, AND DOCONEXENT 2; 62; 27; 200; 15; 12.5; 1; 2 MG/1; MG/1; MG/1; [IU]/1; [IU]/1; MG/1; MG/1; MG/1
27-1-200 CAPSULE, GELATIN COATED ORAL DAILY
Qty: 30 | Refills: 5 | Status: DISCONTINUED | COMMUNITY
Start: 2023-05-08 | End: 2024-05-01

## 2024-05-01 RX ORDER — URINE ACETONE TEST STRIPS
STRIP MISCELLANEOUS
Qty: 1 | Refills: 2 | Status: DISCONTINUED | COMMUNITY
Start: 2023-07-31 | End: 2024-05-01

## 2024-05-01 RX ORDER — LANCETS 33 GAUGE
EACH MISCELLANEOUS
Qty: 4 | Refills: 2 | Status: DISCONTINUED | COMMUNITY
Start: 2023-07-31 | End: 2024-05-01

## 2024-05-01 RX ORDER — VITAMIN C, CALCIUM, IRON, VITAMIN D3, VITAMIN E, THIAMIN, RIBOFLAVIN, NIACINAMIDE, VITAMIN B6, FOLIC ACID, IODINE, ZINC, COPPER, DOCUSATE SODIUM
KIT
Refills: 0 | Status: DISCONTINUED | COMMUNITY
End: 2024-05-01

## 2024-05-01 RX ORDER — INSULIN HUMAN 100 [IU]/ML
100 INJECTION, SUSPENSION SUBCUTANEOUS
Qty: 1 | Refills: 1 | Status: DISCONTINUED | COMMUNITY
Start: 2023-07-31 | End: 2024-05-01

## 2024-05-01 RX ORDER — ISOPROPYL ALCOHOL 0.7 ML/ML
SWAB TOPICAL
Qty: 1 | Refills: 2 | Status: DISCONTINUED | COMMUNITY
Start: 2023-07-31 | End: 2024-05-01

## 2024-05-01 RX ORDER — BLOOD SUGAR DIAGNOSTIC
STRIP MISCELLANEOUS 4 TIMES DAILY
Qty: 4 | Refills: 2 | Status: DISCONTINUED | COMMUNITY
Start: 2023-08-25 | End: 2024-05-01

## 2024-05-01 RX ORDER — DOXYLAMINE SUCCINATE AND PYRIDOXINE HYDROCHLORIDE 10; 10 MG/1; MG/1
10-10 TABLET, DELAYED RELEASE ORAL
Qty: 120 | Refills: 1 | Status: DISCONTINUED | COMMUNITY
Start: 2023-03-09 | End: 2024-05-01

## 2024-05-01 RX ORDER — NORETHINDRONE 0.35 MG/1
0.35 TABLET ORAL DAILY
Qty: 3 | Refills: 1 | Status: DISCONTINUED | COMMUNITY
Start: 2023-11-28 | End: 2024-05-01

## 2024-05-01 NOTE — PLAN
[FreeTextEntry1] : 29 year old presents for annual gyn visit.   HPV/Pap performed today  TVS referral given  RTO 1 year for annual

## 2024-05-01 NOTE — HISTORY OF PRESENT ILLNESS
[Withdrawal] : uses withdrawal [Y] : Patient is sexually active [Monogamous (Male Partner)] : is monogamous with a male partner [FreeTextEntry1] : 29 year old  LMP 1/2023 presents for annual gyn visit. She has not resumed her menses.as  she stopped breastfeeding last month. She denies intermenstrual bleeding, itching, burning, or abnormal discharge. Offers no complaints regarding bowel movement or urination. She has pelvic pain that comes and goes and during intercourse. The pain does come every week. She describes it as a tight pull.    [Mammogramdate] : 2022

## 2024-05-01 NOTE — END OF VISIT
[FreeTextEntry3] : I, Bernice Meeks , acted as a scribe on behalf of Dr. Rocio Guy on 05/01/2024 .  All medical entries made by the scribe were at my, Dr. Rocio Guy, direction and personally dictated by me on 05/01/2024 .. I have reviewed the chart and agree that the record accurately reflects my personal performance of the history, physical exam, assessment and plan. I have also personally directed, reviewed, and agreed with the chart.

## 2024-05-01 NOTE — PHYSICAL EXAM
[Chaperone Present] : A chaperone was present in the examining room during all aspects of the physical examination [Appropriately responsive] : appropriately responsive [Alert] : alert [No Acute Distress] : no acute distress [No Lymphadenopathy] : no lymphadenopathy [Soft] : soft [Non-tender] : non-tender [Non-distended] : non-distended [No HSM] : No HSM [No Lesions] : no lesions [No Mass] : no mass [Oriented x3] : oriented x3 [Examination Of The Breasts] : a normal appearance [No Discharge] : no discharge [No Masses] : no breast masses were palpable [Labia Majora] : normal [Labia Minora] : normal [Normal] : normal [Uterine Adnexae] : normal [02526] : A chaperone was present during the pelvic exam. [FreeTextEntry2] : MONICA Wingsa

## 2024-05-02 LAB — HPV HIGH+LOW RISK DNA PNL CVX: NOT DETECTED

## 2024-05-04 LAB — CYTOLOGY CVX/VAG DOC THIN PREP: NORMAL

## 2024-05-30 ENCOUNTER — APPOINTMENT (OUTPATIENT)
Dept: OBGYN | Facility: CLINIC | Age: 29
End: 2024-05-30

## 2024-11-25 NOTE — PATIENT PROFILE OB - NS PRO TALK SOMEONE YN
Continue present treatment  Low-sodium low-fat diet  Regular exercises   Lab work before the next visit  Return to clinic earlier if any problems   no

## 2025-01-03 NOTE — OB PROVIDER H&P - [id number]_OBGYN_ALL_OB
Patient's mother states Augmentin was sent to [facility] in [place] - they will not have medication until Monday. Requests rx be sent to [facility] in [place] instead. States she has already spoken to [facility] and they have it.   
Sent to [facility] in [place] instead  
Mild
